# Patient Record
Sex: FEMALE | Race: WHITE | Employment: FULL TIME | URBAN - METROPOLITAN AREA
[De-identification: names, ages, dates, MRNs, and addresses within clinical notes are randomized per-mention and may not be internally consistent; named-entity substitution may affect disease eponyms.]

---

## 2017-11-06 ENCOUNTER — APPOINTMENT (OUTPATIENT)
Dept: GENERAL RADIOLOGY | Age: 57
End: 2017-11-06
Attending: PEDIATRICS
Payer: COMMERCIAL

## 2017-11-06 ENCOUNTER — HOSPITAL ENCOUNTER (OUTPATIENT)
Age: 57
Discharge: HOME | End: 2017-11-06
Attending: PEDIATRICS
Payer: COMMERCIAL

## 2017-11-06 VITALS
BODY MASS INDEX: 24 KG/M2 | SYSTOLIC BLOOD PRESSURE: 150 MMHG | OXYGEN SATURATION: 100 % | TEMPERATURE: 98 F | WEIGHT: 140 LBS | DIASTOLIC BLOOD PRESSURE: 72 MMHG | HEART RATE: 67 BPM | RESPIRATION RATE: 16 BRPM

## 2017-11-06 DIAGNOSIS — S50.11XA CONTUSION OF RIGHT FOREARM, INITIAL ENCOUNTER: ICD-10-CM

## 2017-11-06 DIAGNOSIS — S63.501A SPRAIN OF RIGHT WRIST, INITIAL ENCOUNTER: Primary | ICD-10-CM

## 2017-11-06 PROCEDURE — 73110 X-RAY EXAM OF WRIST: CPT | Performed by: PEDIATRICS

## 2017-11-06 PROCEDURE — 73130 X-RAY EXAM OF HAND: CPT | Performed by: PEDIATRICS

## 2017-11-06 PROCEDURE — 99203 OFFICE O/P NEW LOW 30 MIN: CPT

## 2017-11-06 PROCEDURE — 99213 OFFICE O/P EST LOW 20 MIN: CPT

## 2017-11-06 NOTE — ED INITIAL ASSESSMENT (HPI)
7 days ago, pt. Was hiking and slipped on wet rocks multiple times. Pt. Injured right wrist and hand. +swelling and ecchymosis  +tenderness to radial wrist and 1st/2nd metacarpals.    +pain radiates to forearm/elbow area

## 2017-11-06 NOTE — ED PROVIDER NOTES
Patient presents with:  Wrist Pain      HPI:     Edith Tavera is a 56year old female who presents today with a chief complaint of pain in the right hand, wrist, distal forearm area for a little over a week.   Patient states she was on a hike in [facility] with fracture    Labs performed this visit:  No results found for this or any previous visit (from the past 10 hour(s)). Diagnosis:    ICD-10-CM    1. Sprain of right wrist, initial encounter S63.501A    2.  Contusion of right forearm, initial encounter S50.1

## 2017-11-28 ENCOUNTER — OFFICE VISIT (OUTPATIENT)
Dept: INTERNAL MEDICINE CLINIC | Facility: CLINIC | Age: 57
End: 2017-11-28

## 2017-11-28 VITALS
DIASTOLIC BLOOD PRESSURE: 80 MMHG | HEART RATE: 63 BPM | WEIGHT: 152.81 LBS | HEIGHT: 63 IN | SYSTOLIC BLOOD PRESSURE: 148 MMHG | BODY MASS INDEX: 27.07 KG/M2 | OXYGEN SATURATION: 98 % | TEMPERATURE: 98 F

## 2017-11-28 DIAGNOSIS — Z12.39 SCREENING FOR BREAST CANCER: Primary | ICD-10-CM

## 2017-11-28 DIAGNOSIS — E03.9 HYPOTHYROIDISM, UNSPECIFIED TYPE: ICD-10-CM

## 2017-11-28 DIAGNOSIS — E78.00 HYPERCHOLESTEROLEMIA: ICD-10-CM

## 2017-11-28 DIAGNOSIS — R73.9 HYPERGLYCEMIA: ICD-10-CM

## 2017-11-28 DIAGNOSIS — Z00.00 ROUTINE HEALTH MAINTENANCE: ICD-10-CM

## 2017-11-28 DIAGNOSIS — I10 HYPERTENSION, BENIGN: ICD-10-CM

## 2017-11-28 DIAGNOSIS — E55.9 VITAMIN D DEFICIENCY: ICD-10-CM

## 2017-11-28 PROCEDURE — 90471 IMMUNIZATION ADMIN: CPT | Performed by: INTERNAL MEDICINE

## 2017-11-28 PROCEDURE — 99396 PREV VISIT EST AGE 40-64: CPT | Performed by: INTERNAL MEDICINE

## 2017-11-28 PROCEDURE — 90715 TDAP VACCINE 7 YRS/> IM: CPT | Performed by: INTERNAL MEDICINE

## 2017-11-28 RX ORDER — ATENOLOL 50 MG/1
50 TABLET ORAL
Qty: 90 TABLET | Refills: 3 | Status: SHIPPED | OUTPATIENT
Start: 2017-11-28 | End: 2017-11-30

## 2017-11-28 RX ORDER — LEVOTHYROXINE SODIUM 0.03 MG/1
TABLET ORAL
Qty: 90 TABLET | Refills: 3 | Status: SHIPPED | OUTPATIENT
Start: 2017-11-28 | End: 2018-11-25

## 2017-11-28 RX ORDER — ERGOCALCIFEROL 1.25 MG/1
50000 CAPSULE ORAL WEEKLY
Qty: 13 CAPSULE | Refills: 3 | Status: SHIPPED | OUTPATIENT
Start: 2017-11-28 | End: 2018-11-25

## 2017-11-28 NOTE — PROGRESS NOTES
Maegan Fry is a 56year old female. Patient presents with:  Physical: Presents for annual physical. Next PAP due . Last mammogram 2016. Needs refills for all medications. Needs new lab orders, they have .       HPI:   Maegan Fry is a 5 SYSTEMS:   GENERAL: feels well otherwise  SKIN: denies any unusual skin lesions  EYES:denies blurred vision or double vision  HEENT: denies nasal congestion, sinus pain or ST  LUNGS: denies shortness of breath with exertion or cough  CARDIOVASCULAR: denies conservatively, white noise as needed. Has actually been minimal in the past several months.      RTC 1 yr.       Lynda Baig MD  11/28/2017  5:54 PM

## 2017-11-29 ENCOUNTER — TELEPHONE (OUTPATIENT)
Dept: INTERNAL MEDICINE CLINIC | Facility: CLINIC | Age: 57
End: 2017-11-29

## 2017-11-29 NOTE — TELEPHONE ENCOUNTER
[facility] sent a fax requesting an alternative for Atenolol 50 mg. Atenolol is on backorder. Placed in purple folder. To Rx.

## 2017-11-30 RX ORDER — ATENOLOL 25 MG/1
50 TABLET ORAL DAILY
Qty: 180 TABLET | Refills: 0 | Status: SHIPPED | OUTPATIENT
Start: 2017-11-30 | End: 2019-01-17

## 2017-12-01 RX ORDER — LEVOTHYROXINE SODIUM 0.03 MG/1
TABLET ORAL
Qty: 90 TABLET | Refills: 3 | OUTPATIENT
Start: 2017-12-01

## 2017-12-01 RX ORDER — ATENOLOL 50 MG/1
50 TABLET ORAL
Qty: 90 TABLET | Refills: 3 | OUTPATIENT
Start: 2017-12-01

## 2017-12-01 RX ORDER — ERGOCALCIFEROL 1.25 MG/1
50000 CAPSULE ORAL WEEKLY
Qty: 13 CAPSULE | Refills: 3 | OUTPATIENT
Start: 2017-12-01

## 2017-12-01 NOTE — TELEPHONE ENCOUNTER
[facility] - [facility] not [facility];    Pharmacist confirmed they have 25 mg and he will communicate change to pt - new Rx sent in

## 2018-02-28 RX ORDER — ATENOLOL 50 MG/1
50 TABLET ORAL DAILY
Qty: 90 TABLET | Refills: 0 | Status: SHIPPED | OUTPATIENT
Start: 2018-02-28 | End: 2019-03-16

## 2018-02-28 RX ORDER — ATENOLOL 25 MG/1
50 TABLET ORAL DAILY
Qty: 180 TABLET | Refills: 0 | OUTPATIENT
Start: 2018-02-28

## 2018-02-28 NOTE — TELEPHONE ENCOUNTER
Refill done;   [facility],  We refilled your blood pressure medication today (for the 50 mg tablets so take one tablet daily)  At your last visit with doctor, she wanted you to send in your home monitoring blood pressure readings.   Have you been

## 2018-11-21 ENCOUNTER — APPOINTMENT (OUTPATIENT)
Dept: LAB | Facility: HOSPITAL | Age: 58
End: 2018-11-21
Attending: INTERNAL MEDICINE
Payer: COMMERCIAL

## 2018-11-21 DIAGNOSIS — R73.9 HYPERGLYCEMIA: ICD-10-CM

## 2018-11-21 DIAGNOSIS — Z00.00 ROUTINE HEALTH MAINTENANCE: ICD-10-CM

## 2018-11-21 DIAGNOSIS — I10 HYPERTENSION, BENIGN: ICD-10-CM

## 2018-11-21 DIAGNOSIS — E78.00 HYPERCHOLESTEROLEMIA: ICD-10-CM

## 2018-11-21 PROCEDURE — 84443 ASSAY THYROID STIM HORMONE: CPT

## 2018-11-21 PROCEDURE — 36415 COLL VENOUS BLD VENIPUNCTURE: CPT

## 2018-11-21 PROCEDURE — 80061 LIPID PANEL: CPT

## 2018-11-21 PROCEDURE — 82306 VITAMIN D 25 HYDROXY: CPT

## 2018-11-21 PROCEDURE — 80053 COMPREHEN METABOLIC PANEL: CPT

## 2018-11-21 PROCEDURE — 83036 HEMOGLOBIN GLYCOSYLATED A1C: CPT

## 2018-11-25 RX ORDER — LEVOTHYROXINE SODIUM 0.03 MG/1
TABLET ORAL
Qty: 90 TABLET | Refills: 3 | Status: SHIPPED | OUTPATIENT
Start: 2018-11-25 | End: 2019-12-05

## 2018-11-25 RX ORDER — ERGOCALCIFEROL 1.25 MG/1
50000 CAPSULE ORAL WEEKLY
Qty: 13 CAPSULE | Refills: 3 | Status: SHIPPED | OUTPATIENT
Start: 2018-11-25 | End: 2020-02-12

## 2018-12-18 PROBLEM — R73.03 PREDIABETES: Status: ACTIVE | Noted: 2018-12-18

## 2019-01-17 ENCOUNTER — OFFICE VISIT (OUTPATIENT)
Dept: INTERNAL MEDICINE CLINIC | Facility: CLINIC | Age: 59
End: 2019-01-17
Payer: COMMERCIAL

## 2019-01-17 VITALS
OXYGEN SATURATION: 98 % | SYSTOLIC BLOOD PRESSURE: 138 MMHG | BODY MASS INDEX: 26 KG/M2 | DIASTOLIC BLOOD PRESSURE: 88 MMHG | HEART RATE: 68 BPM | TEMPERATURE: 98 F | WEIGHT: 148 LBS

## 2019-01-17 DIAGNOSIS — Z11.51 SCREENING FOR HPV (HUMAN PAPILLOMAVIRUS): ICD-10-CM

## 2019-01-17 DIAGNOSIS — I10 HYPERTENSION, BENIGN: ICD-10-CM

## 2019-01-17 DIAGNOSIS — Z12.31 ENCOUNTER FOR SCREENING MAMMOGRAM FOR BREAST CANCER: ICD-10-CM

## 2019-01-17 DIAGNOSIS — Z11.59 NEED FOR HEPATITIS C SCREENING TEST: ICD-10-CM

## 2019-01-17 DIAGNOSIS — E03.9 HYPOTHYROIDISM, UNSPECIFIED TYPE: ICD-10-CM

## 2019-01-17 DIAGNOSIS — R73.03 PREDIABETES: Primary | ICD-10-CM

## 2019-01-17 DIAGNOSIS — E78.00 HYPERCHOLESTEROLEMIA: ICD-10-CM

## 2019-01-17 DIAGNOSIS — Z00.00 ROUTINE HEALTH MAINTENANCE: ICD-10-CM

## 2019-01-17 PROCEDURE — 99396 PREV VISIT EST AGE 40-64: CPT | Performed by: INTERNAL MEDICINE

## 2019-01-17 NOTE — PROGRESS NOTES
Nilesh Ramachandran is a 66year old female. Patient presents with:  Physical  Gyn Exam      HPI:   Nilesh Ramachandran is a 66year old female who presents for a complete physical exam.     Feels well.       Wt Readings from Last 6 Encounters:  01/17/19 : 148 lb (67.1 kg or dizziness    EXAM:   /88 (BP Location: Right arm, Patient Position: Sitting, Cuff Size: adult)   Pulse 68   Temp 98.2 °F (36.8 °C) (Oral)   Wt 148 lb (67.1 kg)   SpO2 98%   BMI 26.22 kg/m²     GENERAL: well developed, well nourished, in no apparen

## 2019-01-22 LAB — HPV I/H RISK 1 DNA SPEC QL NAA+PROBE: NEGATIVE

## 2019-02-04 ENCOUNTER — APPOINTMENT (OUTPATIENT)
Dept: CT IMAGING | Facility: HOSPITAL | Age: 59
End: 2019-02-04
Attending: EMERGENCY MEDICINE
Payer: COMMERCIAL

## 2019-02-04 ENCOUNTER — TELEPHONE (OUTPATIENT)
Dept: INTERNAL MEDICINE CLINIC | Facility: CLINIC | Age: 59
End: 2019-02-04

## 2019-02-04 ENCOUNTER — HOSPITAL ENCOUNTER (EMERGENCY)
Facility: HOSPITAL | Age: 59
Discharge: HOME | End: 2019-02-04
Attending: EMERGENCY MEDICINE
Payer: COMMERCIAL

## 2019-02-04 VITALS
HEART RATE: 83 BPM | OXYGEN SATURATION: 97 % | WEIGHT: 140 LBS | BODY MASS INDEX: 24.5 KG/M2 | DIASTOLIC BLOOD PRESSURE: 75 MMHG | SYSTOLIC BLOOD PRESSURE: 134 MMHG | TEMPERATURE: 98 F | HEIGHT: 63.5 IN | RESPIRATION RATE: 15 BRPM

## 2019-02-04 DIAGNOSIS — K52.9 ACUTE COLITIS: Primary | ICD-10-CM

## 2019-02-04 LAB
ANION GAP SERPL CALC-SCNC: 12 MMOL/L (ref 0–18)
BASOPHILS # BLD AUTO: 0.01 X10(3) UL (ref 0–0.2)
BASOPHILS NFR BLD AUTO: 0.1 %
BILIRUB UR QL: NEGATIVE
BUN SERPL-MCNC: 10 MG/DL (ref 8–20)
BUN/CREAT SERPL: 13.2 (ref 10–20)
CALCIUM SERPL-MCNC: 9.7 MG/DL (ref 8.5–10.5)
CHLORIDE SERPL-SCNC: 102 MMOL/L (ref 95–110)
CO2 SERPL-SCNC: 25 MMOL/L (ref 22–32)
COLOR UR: YELLOW
CREAT SERPL-MCNC: 0.76 MG/DL (ref 0.5–1.5)
DEPRECATED RDW RBC AUTO: 43.9 FL (ref 35.1–46.3)
EOSINOPHIL # BLD AUTO: 0.07 X10(3) UL (ref 0–0.7)
EOSINOPHIL NFR BLD AUTO: 0.6 %
ERYTHROCYTE [DISTWIDTH] IN BLOOD BY AUTOMATED COUNT: 13.2 % (ref 11–15)
GLUCOSE SERPL-MCNC: 97 MG/DL (ref 70–99)
GLUCOSE UR-MCNC: NEGATIVE MG/DL
HCT VFR BLD AUTO: 41 % (ref 35–48)
HGB BLD-MCNC: 13.4 G/DL (ref 12–16)
IMM GRANULOCYTES # BLD AUTO: 0.03 X10(3) UL (ref 0–1)
IMM GRANULOCYTES NFR BLD: 0.3 %
KETONES UR-MCNC: NEGATIVE MG/DL
LYMPHOCYTES # BLD AUTO: 2.41 X10(3) UL (ref 1–4)
LYMPHOCYTES NFR BLD AUTO: 21.6 %
MCH RBC QN AUTO: 29.3 PG (ref 26–34)
MCHC RBC AUTO-ENTMCNC: 32.7 G/DL (ref 31–37)
MCV RBC AUTO: 89.7 FL (ref 80–100)
MONOCYTES # BLD AUTO: 0.53 X10(3) UL (ref 0.1–1)
MONOCYTES NFR BLD AUTO: 4.8 %
NEUTROPHILS # BLD AUTO: 8.09 X10 (3) UL (ref 1.5–7.7)
NEUTROPHILS # BLD AUTO: 8.09 X10(3) UL (ref 1.5–7.7)
NEUTROPHILS NFR BLD AUTO: 72.6 %
NITRITE UR QL STRIP.AUTO: NEGATIVE
OSMOLALITY UR CALC.SUM OF ELEC: 287 MOSM/KG (ref 275–295)
PH UR: 5 [PH] (ref 5–8)
PLATELET # BLD AUTO: 312 10(3)UL (ref 150–450)
POTASSIUM SERPL-SCNC: 3.7 MMOL/L (ref 3.3–5.1)
PROT UR-MCNC: NEGATIVE MG/DL
RBC # BLD AUTO: 4.57 X10(6)UL (ref 3.8–5.3)
RBC #/AREA URNS AUTO: 4 /HPF
SODIUM SERPL-SCNC: 139 MMOL/L (ref 136–144)
SP GR UR STRIP: 1.01 (ref 1–1.03)
UROBILINOGEN UR STRIP-ACNC: <2
VIT C UR-MCNC: NEGATIVE MG/DL
WBC # BLD AUTO: 11.1 X10(3) UL (ref 4–11)
WBC #/AREA URNS AUTO: 5 /HPF

## 2019-02-04 PROCEDURE — 80048 BASIC METABOLIC PNL TOTAL CA: CPT | Performed by: EMERGENCY MEDICINE

## 2019-02-04 PROCEDURE — 82272 OCCULT BLD FECES 1-3 TESTS: CPT

## 2019-02-04 PROCEDURE — 74177 CT ABD & PELVIS W/CONTRAST: CPT | Performed by: EMERGENCY MEDICINE

## 2019-02-04 PROCEDURE — 81001 URINALYSIS AUTO W/SCOPE: CPT | Performed by: EMERGENCY MEDICINE

## 2019-02-04 PROCEDURE — 99285 EMERGENCY DEPT VISIT HI MDM: CPT

## 2019-02-04 PROCEDURE — 96360 HYDRATION IV INFUSION INIT: CPT

## 2019-02-04 PROCEDURE — 85025 COMPLETE CBC W/AUTO DIFF WBC: CPT | Performed by: EMERGENCY MEDICINE

## 2019-02-04 NOTE — TELEPHONE ENCOUNTER
Spoke to patient to gain information regarding MyChart message below. Patient reports that she has been having abdominal pain since the weekend.  She described it as an intermittent pain that has been occurring about every hour, feels like a \"tight crampin I think it could be an ulcer, yesterday I took 2 Aleve for ankle pain I have.  I have not taken Aleve for a good month but have taken a lot over the years.       Looking to see what I should do next, for me to seek medical advice is a big deal, as I usuall

## 2019-02-05 NOTE — ED INITIAL ASSESSMENT (HPI)
Pt to ER with c/o diffuse abdominal pain 10/10 and 3 episode of black stools. Pt denies chest pain or sob. Pt denies dizziness. No respiratory distress noted. 98% on room air. Pt is alert and oriented x4. No thinners or asa.

## 2019-02-05 NOTE — ED PROVIDER NOTES
Patient Seen in: [facility] Emergency Department    History   Patient presents with:  Abdomen/Flank Pain (GI/)  GI Bleeding (gastrointestinal)    Stated Complaint: black stool and bright red blood     HPI    The patient is a 62-year-old female w round, and reactive to light. Neck: Normal range of motion. Neck supple. No JVD present. Cardiovascular: Normal rate, regular rhythm, normal heart sounds and intact distal pulses. No murmur heard.   Pulmonary/Chest: Effort normal and breath sounds nor view results for these tests on the individual orders.    RAINBOW DRAW BLUE   RAINBOW DRAW LAVENDER   RAINBOW DRAW DARK GREEN   RAINBOW DRAW LIGHT GREEN   RAINBOW DRAW GOLD   RAINBOW DRAW LAVENDER TALL (BNP)   GI STOOL PANEL BY PCR          Case discussed w

## 2019-03-17 RX ORDER — ATENOLOL 50 MG/1
50 TABLET ORAL DAILY
Qty: 90 TABLET | Refills: 0 | Status: SHIPPED | OUTPATIENT
Start: 2019-03-17 | End: 2019-06-11

## 2019-03-21 RX ORDER — ATENOLOL 50 MG/1
50 TABLET ORAL DAILY
Qty: 90 TABLET | Refills: 0 | OUTPATIENT
Start: 2019-03-21

## 2019-03-21 NOTE — TELEPHONE ENCOUNTER
Current refill request refused due to refill is either a duplicate request or has active refills at the pharmacy. Check previous templates.     Requested Prescriptions     Refused Prescriptions Disp Refills   • ATENOLOL 50 MG Oral Tab [Pharmacy Med Name: A

## 2019-06-11 RX ORDER — ATENOLOL 50 MG/1
50 TABLET ORAL DAILY
Qty: 90 TABLET | Refills: 0 | Status: SHIPPED | OUTPATIENT
Start: 2019-06-11 | End: 2019-09-06

## 2019-06-11 NOTE — TELEPHONE ENCOUNTER
Pt has 6 month f/u schedule 7/19. Refill request is for a maintenance medication and has met the criteria specified in the Ambulatory Medication Refill Standing Order for eligibility, visits, laboratory, alerts and was sent to the requested pharmacy.

## 2019-09-06 RX ORDER — ATENOLOL 50 MG/1
50 TABLET ORAL DAILY
Qty: 90 TABLET | Refills: 0 | Status: SHIPPED | OUTPATIENT
Start: 2019-09-06 | End: 2020-01-02

## 2019-12-05 ENCOUNTER — TELEPHONE (OUTPATIENT)
Dept: INTERNAL MEDICINE CLINIC | Facility: CLINIC | Age: 59
End: 2019-12-05

## 2019-12-05 DIAGNOSIS — E55.9 VITAMIN D DEFICIENCY: ICD-10-CM

## 2019-12-05 DIAGNOSIS — E78.00 HYPERCHOLESTEROLEMIA: ICD-10-CM

## 2019-12-05 DIAGNOSIS — I10 HYPERTENSION, BENIGN: ICD-10-CM

## 2019-12-05 DIAGNOSIS — E03.9 HYPOTHYROIDISM, UNSPECIFIED TYPE: ICD-10-CM

## 2019-12-05 DIAGNOSIS — Z00.00 ANNUAL PHYSICAL EXAM: Primary | ICD-10-CM

## 2019-12-05 DIAGNOSIS — R73.03 PREDIABETES: ICD-10-CM

## 2019-12-05 RX ORDER — LEVOTHYROXINE SODIUM 0.03 MG/1
TABLET ORAL
Qty: 90 TABLET | Refills: 0 | Status: SHIPPED | OUTPATIENT
Start: 2019-12-05 | End: 2020-02-12

## 2019-12-05 NOTE — TELEPHONE ENCOUNTER
Patient is overdue for labs. Due for physical in January, this is already scheduled. Three month supply of levothyroxine sent to her pharmacy. Labs pended to Dr. Baldev Cleaning.  Some labs were ordered back in January and I was not sure if you wanted to reorder t

## 2019-12-06 NOTE — TELEPHONE ENCOUNTER
Pt called back and was informed that her labs were ordered and that she has to fast for 12 hours beforehand.

## 2019-12-13 ENCOUNTER — LAB ENCOUNTER (OUTPATIENT)
Dept: LAB | Facility: HOSPITAL | Age: 59
End: 2019-12-13
Attending: INTERNAL MEDICINE
Payer: COMMERCIAL

## 2019-12-13 DIAGNOSIS — Z00.00 ANNUAL PHYSICAL EXAM: ICD-10-CM

## 2019-12-13 DIAGNOSIS — E78.00 HYPERCHOLESTEROLEMIA: ICD-10-CM

## 2019-12-13 DIAGNOSIS — E03.9 HYPOTHYROIDISM, UNSPECIFIED TYPE: ICD-10-CM

## 2019-12-13 DIAGNOSIS — E55.9 VITAMIN D DEFICIENCY: ICD-10-CM

## 2019-12-13 DIAGNOSIS — I10 HYPERTENSION, BENIGN: ICD-10-CM

## 2019-12-13 DIAGNOSIS — R73.03 PREDIABETES: ICD-10-CM

## 2019-12-13 PROCEDURE — 82570 ASSAY OF URINE CREATININE: CPT

## 2019-12-13 PROCEDURE — 80053 COMPREHEN METABOLIC PANEL: CPT

## 2019-12-13 PROCEDURE — 82306 VITAMIN D 25 HYDROXY: CPT

## 2019-12-13 PROCEDURE — 83036 HEMOGLOBIN GLYCOSYLATED A1C: CPT

## 2019-12-13 PROCEDURE — 82043 UR ALBUMIN QUANTITATIVE: CPT

## 2019-12-13 PROCEDURE — 84443 ASSAY THYROID STIM HORMONE: CPT

## 2019-12-13 PROCEDURE — 80061 LIPID PANEL: CPT

## 2019-12-13 PROCEDURE — 36415 COLL VENOUS BLD VENIPUNCTURE: CPT

## 2019-12-13 PROCEDURE — 85025 COMPLETE CBC W/AUTO DIFF WBC: CPT

## 2020-01-02 RX ORDER — ATENOLOL 50 MG/1
50 TABLET ORAL DAILY
Qty: 90 TABLET | Refills: 3 | Status: SHIPPED | OUTPATIENT
Start: 2020-01-02 | End: 2020-12-14

## 2020-01-19 ENCOUNTER — HOSPITAL ENCOUNTER (OUTPATIENT)
Age: 60
Discharge: HOME | End: 2020-01-19
Attending: FAMILY MEDICINE
Payer: COMMERCIAL

## 2020-01-19 VITALS
SYSTOLIC BLOOD PRESSURE: 138 MMHG | TEMPERATURE: 98 F | OXYGEN SATURATION: 98 % | HEART RATE: 77 BPM | DIASTOLIC BLOOD PRESSURE: 70 MMHG | RESPIRATION RATE: 14 BRPM

## 2020-01-19 DIAGNOSIS — J02.0 STREP THROAT: Primary | ICD-10-CM

## 2020-01-19 LAB — S PYO AG THROAT QL: POSITIVE

## 2020-01-19 PROCEDURE — 99214 OFFICE O/P EST MOD 30 MIN: CPT

## 2020-01-19 PROCEDURE — 87430 STREP A AG IA: CPT

## 2020-01-19 PROCEDURE — 99213 OFFICE O/P EST LOW 20 MIN: CPT

## 2020-01-19 RX ORDER — AZITHROMYCIN 500 MG/1
500 TABLET, FILM COATED ORAL DAILY
Qty: 5 TABLET | Refills: 0 | Status: SHIPPED | OUTPATIENT
Start: 2020-01-19 | End: 2020-01-24

## 2020-01-19 NOTE — ED PROVIDER NOTES
Patient presents with:  Sore Throat      HPI:     Marcus Cha is a 64year old female who presents with for chief complaint of nasal congestion, sore throat, fatigue. X 3 days.     The patient denies complaints of  neck pain, ear pain, difficulty breathin regular rhythm and intact distal pulses. Exam reveals no gallop and no friction rub. No murmur heard. 4.RESPIRATORY/Pulmonary/Chest: Effort normal and breath sounds normal. No stridor. No respiratory distress. no wheezes. no rales.     5. GI: Abdominal

## 2020-02-05 ENCOUNTER — TELEPHONE (OUTPATIENT)
Dept: INTERNAL MEDICINE CLINIC | Facility: CLINIC | Age: 60
End: 2020-02-05

## 2020-02-05 DIAGNOSIS — Z12.39 SCREENING FOR BREAST CANCER: Primary | ICD-10-CM

## 2020-02-05 NOTE — TELEPHONE ENCOUNTER
Mammogram order has   Requests new order so she can have mammo done prior to her appt with Dr Sung Cordero on      Please call to confirm [phone]

## 2020-02-05 NOTE — TELEPHONE ENCOUNTER
New order for mammogram entered per protocol and called patient to inform - verbalized understanding

## 2020-02-12 ENCOUNTER — OFFICE VISIT (OUTPATIENT)
Dept: INTERNAL MEDICINE CLINIC | Facility: CLINIC | Age: 60
End: 2020-02-12
Payer: COMMERCIAL

## 2020-02-12 VITALS
TEMPERATURE: 98 F | WEIGHT: 150 LBS | SYSTOLIC BLOOD PRESSURE: 112 MMHG | DIASTOLIC BLOOD PRESSURE: 70 MMHG | BODY MASS INDEX: 26.25 KG/M2 | HEIGHT: 63.5 IN | HEART RATE: 64 BPM

## 2020-02-12 DIAGNOSIS — E78.00 HYPERCHOLESTEROLEMIA: ICD-10-CM

## 2020-02-12 DIAGNOSIS — E03.9 HYPOTHYROIDISM, UNSPECIFIED TYPE: ICD-10-CM

## 2020-02-12 DIAGNOSIS — R73.03 PREDIABETES: ICD-10-CM

## 2020-02-12 DIAGNOSIS — E55.9 VITAMIN D DEFICIENCY: ICD-10-CM

## 2020-02-12 DIAGNOSIS — Z78.0 POSTMENOPAUSE: Primary | ICD-10-CM

## 2020-02-12 DIAGNOSIS — Z00.00 ROUTINE HEALTH MAINTENANCE: ICD-10-CM

## 2020-02-12 DIAGNOSIS — I10 HYPERTENSION, BENIGN: ICD-10-CM

## 2020-02-12 PROCEDURE — 99396 PREV VISIT EST AGE 40-64: CPT | Performed by: INTERNAL MEDICINE

## 2020-02-12 RX ORDER — LEVOTHYROXINE SODIUM 0.03 MG/1
25 TABLET ORAL
Qty: 90 TABLET | Refills: 3 | Status: SHIPPED | OUTPATIENT
Start: 2020-02-12 | End: 2021-03-11

## 2020-02-12 NOTE — PROGRESS NOTES
Kartik Yen is a 64year old female. Patient presents with:  Physical: Patient is here today for a PE. Last pap exam negative in 1/2019. Patient states that she has been feeling good lately. No issues at this time.        HPI:   Kartik Yen is a 64 year ol Used    Alcohol use: Yes      Comment: Occasionally    Drug use: No         REVIEW OF SYSTEMS:   GENERAL: feels well otherwise  SKIN: denies any unusual skin lesions  EYES:denies blurred vision or double vision  HEENT: denies nasal congestion, sinus pain o

## 2020-02-18 ENCOUNTER — HOSPITAL ENCOUNTER (OUTPATIENT)
Dept: MAMMOGRAPHY | Age: 60
Discharge: HOME | End: 2020-02-18
Attending: INTERNAL MEDICINE
Payer: COMMERCIAL

## 2020-02-18 ENCOUNTER — HOSPITAL ENCOUNTER (OUTPATIENT)
Dept: BONE DENSITY | Age: 60
Discharge: HOME | End: 2020-02-18
Attending: INTERNAL MEDICINE
Payer: COMMERCIAL

## 2020-02-18 DIAGNOSIS — Z78.0 POSTMENOPAUSE: ICD-10-CM

## 2020-02-18 DIAGNOSIS — Z12.39 SCREENING FOR BREAST CANCER: ICD-10-CM

## 2020-02-18 PROCEDURE — 77063 BREAST TOMOSYNTHESIS BI: CPT | Performed by: INTERNAL MEDICINE

## 2020-02-18 PROCEDURE — 77067 SCR MAMMO BI INCL CAD: CPT | Performed by: INTERNAL MEDICINE

## 2020-02-18 PROCEDURE — 77080 DXA BONE DENSITY AXIAL: CPT | Performed by: INTERNAL MEDICINE

## 2020-12-14 RX ORDER — ATENOLOL 50 MG/1
TABLET ORAL
Qty: 90 TABLET | Refills: 3 | Status: SHIPPED | OUTPATIENT
Start: 2020-12-14 | End: 2021-04-01

## 2021-03-11 RX ORDER — LEVOTHYROXINE SODIUM 0.03 MG/1
TABLET ORAL
Qty: 90 TABLET | Refills: 0 | Status: SHIPPED | OUTPATIENT
Start: 2021-03-11 | End: 2021-04-01

## 2021-03-11 NOTE — TELEPHONE ENCOUNTER
Pt due for physical. Has appt scheduled 4/1/21. Short fill given.       Refill request is for a maintenance medication and has met the criteria specified in the Ambulatory Medication Refill Standing Order for eligibility, visits, laboratory, alerts and was

## 2021-04-01 ENCOUNTER — OFFICE VISIT (OUTPATIENT)
Dept: INTERNAL MEDICINE CLINIC | Facility: CLINIC | Age: 61
End: 2021-04-01
Payer: COMMERCIAL

## 2021-04-01 VITALS
BODY MASS INDEX: 26.71 KG/M2 | HEIGHT: 63.5 IN | SYSTOLIC BLOOD PRESSURE: 132 MMHG | HEART RATE: 66 BPM | OXYGEN SATURATION: 99 % | WEIGHT: 152.63 LBS | TEMPERATURE: 98 F | DIASTOLIC BLOOD PRESSURE: 92 MMHG

## 2021-04-01 DIAGNOSIS — I10 HYPERTENSION, BENIGN: ICD-10-CM

## 2021-04-01 DIAGNOSIS — R73.03 PREDIABETES: ICD-10-CM

## 2021-04-01 DIAGNOSIS — E03.9 HYPOTHYROIDISM, UNSPECIFIED TYPE: ICD-10-CM

## 2021-04-01 DIAGNOSIS — E55.9 VITAMIN D DEFICIENCY: ICD-10-CM

## 2021-04-01 DIAGNOSIS — Z00.00 ROUTINE HEALTH MAINTENANCE: ICD-10-CM

## 2021-04-01 DIAGNOSIS — E78.00 HYPERCHOLESTEROLEMIA: Primary | ICD-10-CM

## 2021-04-01 DIAGNOSIS — Z83.79 FAMILY HISTORY OF CELIAC DISEASE: ICD-10-CM

## 2021-04-01 DIAGNOSIS — Z12.31 ENCOUNTER FOR SCREENING MAMMOGRAM FOR MALIGNANT NEOPLASM OF BREAST: ICD-10-CM

## 2021-04-01 PROCEDURE — 3080F DIAST BP >= 90 MM HG: CPT | Performed by: INTERNAL MEDICINE

## 2021-04-01 PROCEDURE — 99396 PREV VISIT EST AGE 40-64: CPT | Performed by: INTERNAL MEDICINE

## 2021-04-01 PROCEDURE — 3008F BODY MASS INDEX DOCD: CPT | Performed by: INTERNAL MEDICINE

## 2021-04-01 PROCEDURE — 3075F SYST BP GE 130 - 139MM HG: CPT | Performed by: INTERNAL MEDICINE

## 2021-04-01 RX ORDER — ATENOLOL 50 MG/1
50 TABLET ORAL DAILY
Qty: 90 TABLET | Refills: 3 | Status: SHIPPED | OUTPATIENT
Start: 2021-04-01

## 2021-04-01 RX ORDER — LEVOTHYROXINE SODIUM 0.03 MG/1
25 TABLET ORAL
Qty: 90 TABLET | Refills: 3 | Status: SHIPPED | OUTPATIENT
Start: 2021-04-01

## 2021-04-01 NOTE — PROGRESS NOTES
Nikita Loza is a 64year old female. Patient presents with:  Physical: Last pap done January 2019. Feels well. HPI:   Nikita Loza is a 64year old female who presents for a complete physical exam.     Feels well.   Has been exercising at home with hu otherwise  SKIN: denies any unusual skin lesions  EYES:denies blurred vision or double vision  HEENT: denies nasal congestion, sinus pain or ST  LUNGS: denies shortness of breath with exertion or cough  CARDIOVASCULAR: denies chest pain, pressure, or palpi

## 2021-05-19 ENCOUNTER — IMMUNIZATION (OUTPATIENT)
Dept: LAB | Facility: HOSPITAL | Age: 61
End: 2021-05-19
Attending: EMERGENCY MEDICINE
Payer: COMMERCIAL

## 2021-05-19 DIAGNOSIS — Z23 NEED FOR VACCINATION: Primary | ICD-10-CM

## 2021-05-19 PROCEDURE — 0001A SARSCOV2 VAC 30MCG/0.3ML IM: CPT

## 2021-06-09 ENCOUNTER — IMMUNIZATION (OUTPATIENT)
Dept: LAB | Facility: HOSPITAL | Age: 61
End: 2021-06-09
Attending: EMERGENCY MEDICINE
Payer: COMMERCIAL

## 2021-06-09 DIAGNOSIS — Z23 NEED FOR VACCINATION: Primary | ICD-10-CM

## 2021-06-09 PROCEDURE — 0002A SARSCOV2 VAC 30MCG/0.3ML IM: CPT

## 2021-07-23 ENCOUNTER — PATIENT MESSAGE (OUTPATIENT)
Dept: INTERNAL MEDICINE CLINIC | Facility: CLINIC | Age: 61
End: 2021-07-23

## 2021-07-23 ENCOUNTER — TELEPHONE (OUTPATIENT)
Dept: INTERNAL MEDICINE CLINIC | Facility: CLINIC | Age: 61
End: 2021-07-23

## 2021-07-23 NOTE — TELEPHONE ENCOUNTER
----- Message from BufordNiurka Hicks sent at 7/23/2021  8:32 AM CDT -----  Regarding: Non-Urgent Medical Question  Contact: [phone]  Hi Dr. Laurance Goodpasture, wanted to ask you if it’s possible to get a torn muscle from a needle.   I have had pain in the upper arm

## 2021-07-23 NOTE — TELEPHONE ENCOUNTER
Spoke with patient who reports she received her COVID vaccine 6/9/21 and her arm \"instantly hurt\", the needle felt like a \"jackhammer\". Arm doesn't hurt at rest or when there is no pressure on the area.  She states pain occurs with pressure, when laying

## 2021-07-27 ENCOUNTER — OFFICE VISIT (OUTPATIENT)
Dept: INTERNAL MEDICINE CLINIC | Facility: CLINIC | Age: 61
End: 2021-07-27
Payer: COMMERCIAL

## 2021-07-27 VITALS
DIASTOLIC BLOOD PRESSURE: 86 MMHG | TEMPERATURE: 98 F | HEART RATE: 58 BPM | WEIGHT: 153 LBS | OXYGEN SATURATION: 99 % | BODY MASS INDEX: 26.77 KG/M2 | HEIGHT: 63.5 IN | SYSTOLIC BLOOD PRESSURE: 136 MMHG

## 2021-07-27 DIAGNOSIS — M79.602 ARM PAIN, CENTRAL, LEFT: Primary | ICD-10-CM

## 2021-07-27 PROCEDURE — 99213 OFFICE O/P EST LOW 20 MIN: CPT | Performed by: INTERNAL MEDICINE

## 2021-07-27 PROCEDURE — 3079F DIAST BP 80-89 MM HG: CPT | Performed by: INTERNAL MEDICINE

## 2021-07-27 PROCEDURE — 3008F BODY MASS INDEX DOCD: CPT | Performed by: INTERNAL MEDICINE

## 2021-07-27 PROCEDURE — 3075F SYST BP GE 130 - 139MM HG: CPT | Performed by: INTERNAL MEDICINE

## 2021-07-27 RX ORDER — MELOXICAM 7.5 MG/1
7.5 TABLET ORAL DAILY
Qty: 30 TABLET | Refills: 0 | Status: SHIPPED | OUTPATIENT
Start: 2021-07-27

## 2021-07-27 NOTE — PROGRESS NOTES
Jessica Callaway is a 63year old female. Patient presents with:  Checkup: Patient reports arm pain since COVID vaccine 6/9/21.  She states pain occurs with pressure, when laying on her side, if she has her elbow on the table she will feel the pain shoot up to t Encounters:  07/27/21 : 153 lb (69.4 kg)  04/01/21 : 152 lb 9.6 oz (69.2 kg)  02/12/20 : 150 lb (68 kg)  02/04/19 : 140 lb (63.5 kg)  01/17/19 : 148 lb (67.1 kg)    Body mass index is 26.68 kg/m².       EXAM:   /86   Pulse 58   Temp 98.1 °F (36.7 °C)

## 2021-08-03 ENCOUNTER — HOSPITAL ENCOUNTER (OUTPATIENT)
Dept: MAMMOGRAPHY | Age: 61
Discharge: HOME | End: 2021-08-03
Attending: INTERNAL MEDICINE
Payer: COMMERCIAL

## 2021-08-03 DIAGNOSIS — Z12.31 ENCOUNTER FOR SCREENING MAMMOGRAM FOR MALIGNANT NEOPLASM OF BREAST: ICD-10-CM

## 2021-08-03 PROCEDURE — 77063 BREAST TOMOSYNTHESIS BI: CPT | Performed by: INTERNAL MEDICINE

## 2021-08-03 PROCEDURE — 77067 SCR MAMMO BI INCL CAD: CPT | Performed by: INTERNAL MEDICINE

## 2021-08-20 RX ORDER — MELOXICAM 7.5 MG/1
TABLET ORAL
Qty: 30 TABLET | Refills: 0 | OUTPATIENT
Start: 2021-08-20

## 2021-12-02 ENCOUNTER — LAB ENCOUNTER (OUTPATIENT)
Dept: LAB | Facility: HOSPITAL | Age: 61
End: 2021-12-02
Attending: INTERNAL MEDICINE
Payer: COMMERCIAL

## 2021-12-02 DIAGNOSIS — E55.9 VITAMIN D DEFICIENCY: ICD-10-CM

## 2021-12-02 DIAGNOSIS — R73.03 PREDIABETES: ICD-10-CM

## 2021-12-02 DIAGNOSIS — I10 HYPERTENSION, BENIGN: ICD-10-CM

## 2021-12-02 DIAGNOSIS — Z83.79 FAMILY HISTORY OF CELIAC DISEASE: ICD-10-CM

## 2021-12-02 DIAGNOSIS — E78.00 HYPERCHOLESTEROLEMIA: ICD-10-CM

## 2021-12-02 PROCEDURE — 84443 ASSAY THYROID STIM HORMONE: CPT | Performed by: INTERNAL MEDICINE

## 2021-12-02 PROCEDURE — 85025 COMPLETE CBC W/AUTO DIFF WBC: CPT

## 2021-12-02 PROCEDURE — 82043 UR ALBUMIN QUANTITATIVE: CPT

## 2021-12-02 PROCEDURE — 36415 COLL VENOUS BLD VENIPUNCTURE: CPT | Performed by: INTERNAL MEDICINE

## 2021-12-02 PROCEDURE — 83036 HEMOGLOBIN GLYCOSYLATED A1C: CPT | Performed by: INTERNAL MEDICINE

## 2021-12-02 PROCEDURE — 80053 COMPREHEN METABOLIC PANEL: CPT

## 2021-12-02 PROCEDURE — 80061 LIPID PANEL: CPT

## 2021-12-02 PROCEDURE — 82570 ASSAY OF URINE CREATININE: CPT

## 2021-12-02 PROCEDURE — 82784 ASSAY IGA/IGD/IGG/IGM EACH: CPT

## 2021-12-02 PROCEDURE — 82306 VITAMIN D 25 HYDROXY: CPT

## 2021-12-02 PROCEDURE — 83516 IMMUNOASSAY NONANTIBODY: CPT

## 2021-12-15 DIAGNOSIS — R73.03 PREDIABETES: ICD-10-CM

## 2021-12-15 DIAGNOSIS — E78.00 HYPERCHOLESTEROLEMIA: ICD-10-CM

## 2021-12-15 DIAGNOSIS — E55.9 VITAMIN D DEFICIENCY: Primary | ICD-10-CM

## 2021-12-15 RX ORDER — CHOLECALCIFEROL (VITAMIN D3) 50 MCG
4000 TABLET ORAL DAILY
Qty: 1 TABLET | Refills: 0 | COMMUNITY
Start: 2021-12-15

## 2022-06-29 RX ORDER — LEVOTHYROXINE SODIUM 0.03 MG/1
25 TABLET ORAL
Qty: 90 TABLET | Refills: 0 | Status: SHIPPED | OUTPATIENT
Start: 2022-06-29

## 2022-06-30 RX ORDER — ATENOLOL 50 MG/1
50 TABLET ORAL DAILY
Qty: 30 TABLET | Refills: 0 | Status: SHIPPED | OUTPATIENT
Start: 2022-06-30

## 2022-07-08 ENCOUNTER — LAB ENCOUNTER (OUTPATIENT)
Dept: LAB | Facility: HOSPITAL | Age: 62
End: 2022-07-08
Attending: INTERNAL MEDICINE
Payer: COMMERCIAL

## 2022-07-08 DIAGNOSIS — E55.9 VITAMIN D DEFICIENCY: ICD-10-CM

## 2022-07-08 DIAGNOSIS — R73.03 PREDIABETES: ICD-10-CM

## 2022-07-08 DIAGNOSIS — E78.00 HYPERCHOLESTEROLEMIA: ICD-10-CM

## 2022-07-08 LAB
ALBUMIN SERPL-MCNC: 4.1 G/DL (ref 3.4–5)
ALBUMIN/GLOB SERPL: 1.1 {RATIO} (ref 1–2)
ALP LIVER SERPL-CCNC: 89 U/L
ALT SERPL-CCNC: 36 U/L
ANION GAP SERPL CALC-SCNC: 6 MMOL/L (ref 0–18)
AST SERPL-CCNC: 19 U/L (ref 15–37)
BILIRUB SERPL-MCNC: 0.7 MG/DL (ref 0.1–2)
BUN BLD-MCNC: 16 MG/DL (ref 7–18)
BUN/CREAT SERPL: 17.6 (ref 10–20)
CALCIUM BLD-MCNC: 9.8 MG/DL (ref 8.5–10.1)
CHLORIDE SERPL-SCNC: 105 MMOL/L (ref 98–112)
CHOLEST SERPL-MCNC: 255 MG/DL (ref ?–200)
CO2 SERPL-SCNC: 28 MMOL/L (ref 21–32)
CREAT BLD-MCNC: 0.91 MG/DL
EST. AVERAGE GLUCOSE BLD GHB EST-MCNC: 117 MG/DL (ref 68–126)
FASTING PATIENT LIPID ANSWER: YES
FASTING STATUS PATIENT QL REPORTED: YES
GLOBULIN PLAS-MCNC: 3.7 G/DL (ref 2.8–4.4)
GLUCOSE BLD-MCNC: 98 MG/DL (ref 70–99)
HBA1C MFR BLD: 5.7 % (ref ?–5.7)
HDLC SERPL-MCNC: 51 MG/DL (ref 40–59)
LDLC SERPL CALC-MCNC: 174 MG/DL (ref ?–100)
NONHDLC SERPL-MCNC: 204 MG/DL (ref ?–130)
OSMOLALITY SERPL CALC.SUM OF ELEC: 289 MOSM/KG (ref 275–295)
POTASSIUM SERPL-SCNC: 4.1 MMOL/L (ref 3.5–5.1)
PROT SERPL-MCNC: 7.8 G/DL (ref 6.4–8.2)
SODIUM SERPL-SCNC: 139 MMOL/L (ref 136–145)
TRIGL SERPL-MCNC: 165 MG/DL (ref 30–149)
VIT D+METAB SERPL-MCNC: 29.6 NG/ML (ref 30–100)
VLDLC SERPL CALC-MCNC: 33 MG/DL (ref 0–30)

## 2022-07-08 PROCEDURE — 80061 LIPID PANEL: CPT

## 2022-07-08 PROCEDURE — 82306 VITAMIN D 25 HYDROXY: CPT

## 2022-07-08 PROCEDURE — 83036 HEMOGLOBIN GLYCOSYLATED A1C: CPT

## 2022-07-08 PROCEDURE — 80053 COMPREHEN METABOLIC PANEL: CPT

## 2022-07-08 PROCEDURE — 36415 COLL VENOUS BLD VENIPUNCTURE: CPT

## 2022-07-14 ENCOUNTER — OFFICE VISIT (OUTPATIENT)
Dept: INTERNAL MEDICINE CLINIC | Facility: CLINIC | Age: 62
End: 2022-07-14
Payer: COMMERCIAL

## 2022-07-14 ENCOUNTER — HOSPITAL ENCOUNTER (OUTPATIENT)
Dept: ULTRASOUND IMAGING | Facility: HOSPITAL | Age: 62
Discharge: HOME | End: 2022-07-14
Attending: INTERNAL MEDICINE
Payer: COMMERCIAL

## 2022-07-14 VITALS
DIASTOLIC BLOOD PRESSURE: 84 MMHG | OXYGEN SATURATION: 98 % | HEART RATE: 76 BPM | WEIGHT: 158 LBS | BODY MASS INDEX: 28 KG/M2 | SYSTOLIC BLOOD PRESSURE: 150 MMHG

## 2022-07-14 DIAGNOSIS — E03.9 HYPOTHYROIDISM, UNSPECIFIED TYPE: ICD-10-CM

## 2022-07-14 DIAGNOSIS — R73.03 PREDIABETES: ICD-10-CM

## 2022-07-14 DIAGNOSIS — Z12.31 ENCOUNTER FOR SCREENING MAMMOGRAM FOR MALIGNANT NEOPLASM OF BREAST: Primary | ICD-10-CM

## 2022-07-14 DIAGNOSIS — Z00.00 ROUTINE HEALTH MAINTENANCE: ICD-10-CM

## 2022-07-14 DIAGNOSIS — I10 HYPERTENSION, BENIGN: ICD-10-CM

## 2022-07-14 DIAGNOSIS — E55.9 VITAMIN D DEFICIENCY: ICD-10-CM

## 2022-07-14 DIAGNOSIS — E78.00 HYPERCHOLESTEROLEMIA: ICD-10-CM

## 2022-07-14 DIAGNOSIS — M79.89 RIGHT LEG SWELLING: ICD-10-CM

## 2022-07-14 PROCEDURE — 3079F DIAST BP 80-89 MM HG: CPT | Performed by: INTERNAL MEDICINE

## 2022-07-14 PROCEDURE — 3077F SYST BP >= 140 MM HG: CPT | Performed by: INTERNAL MEDICINE

## 2022-07-14 PROCEDURE — 99396 PREV VISIT EST AGE 40-64: CPT | Performed by: INTERNAL MEDICINE

## 2022-07-14 PROCEDURE — 93971 EXTREMITY STUDY: CPT | Performed by: INTERNAL MEDICINE

## 2022-07-14 PROCEDURE — 99213 OFFICE O/P EST LOW 20 MIN: CPT | Performed by: INTERNAL MEDICINE

## 2022-07-14 RX ORDER — ATENOLOL 50 MG/1
50 TABLET ORAL DAILY
Qty: 90 TABLET | Refills: 3 | Status: SHIPPED | OUTPATIENT
Start: 2022-07-14

## 2022-07-14 RX ORDER — LEVOTHYROXINE SODIUM 0.03 MG/1
25 TABLET ORAL
Qty: 90 TABLET | Refills: 3 | Status: SHIPPED | OUTPATIENT
Start: 2022-07-14

## 2022-08-12 RX ORDER — ATENOLOL 50 MG/1
50 TABLET ORAL DAILY
Qty: 90 TABLET | Refills: 3 | OUTPATIENT
Start: 2022-08-12

## 2022-08-12 RX ORDER — LEVOTHYROXINE SODIUM 0.03 MG/1
25 TABLET ORAL
Qty: 90 TABLET | Refills: 3 | OUTPATIENT
Start: 2022-08-12

## 2022-08-14 ENCOUNTER — PATIENT MESSAGE (OUTPATIENT)
Dept: INTERNAL MEDICINE CLINIC | Facility: CLINIC | Age: 62
End: 2022-08-14

## 2022-08-14 RX ORDER — ATENOLOL 50 MG/1
50 TABLET ORAL DAILY
Qty: 90 TABLET | Refills: 3 | Status: CANCELLED | OUTPATIENT
Start: 2022-08-14

## 2022-08-16 RX ORDER — ATENOLOL 50 MG/1
50 TABLET ORAL DAILY
Qty: 90 TABLET | Refills: 3 | Status: SHIPPED | OUTPATIENT
Start: 2022-08-16

## 2022-08-16 NOTE — TELEPHONE ENCOUNTER
From: Yohan Sensing  To: Javad Mccoy MD  Sent: 8/14/2022 10:28 PM CDT  Subject: Refill of atenolol     Dr Mark Le, my refill from July was only filled with 30 pills not 90 and [facility]. I only have 4 pills left so I cannot wait until then. Will you please submit a new 90 day refill.  Thank you Giselle

## 2022-10-28 ENCOUNTER — HOSPITAL ENCOUNTER (OUTPATIENT)
Dept: MAMMOGRAPHY | Age: 62
Discharge: HOME | End: 2022-10-28
Attending: INTERNAL MEDICINE
Payer: COMMERCIAL

## 2022-10-28 DIAGNOSIS — Z12.31 ENCOUNTER FOR SCREENING MAMMOGRAM FOR MALIGNANT NEOPLASM OF BREAST: ICD-10-CM

## 2022-10-28 PROCEDURE — 77063 BREAST TOMOSYNTHESIS BI: CPT | Performed by: INTERNAL MEDICINE

## 2022-10-28 PROCEDURE — 77067 SCR MAMMO BI INCL CAD: CPT | Performed by: INTERNAL MEDICINE

## 2023-09-28 ENCOUNTER — OFFICE VISIT (OUTPATIENT)
Dept: INTERNAL MEDICINE CLINIC | Facility: CLINIC | Age: 63
End: 2023-09-28

## 2023-09-28 VITALS
TEMPERATURE: 98 F | SYSTOLIC BLOOD PRESSURE: 118 MMHG | DIASTOLIC BLOOD PRESSURE: 86 MMHG | HEIGHT: 63.5 IN | WEIGHT: 157.19 LBS | HEART RATE: 60 BPM | BODY MASS INDEX: 27.51 KG/M2 | OXYGEN SATURATION: 99 %

## 2023-09-28 DIAGNOSIS — R73.03 PREDIABETES: ICD-10-CM

## 2023-09-28 DIAGNOSIS — I10 HYPERTENSION, BENIGN: ICD-10-CM

## 2023-09-28 DIAGNOSIS — E78.00 HYPERCHOLESTEROLEMIA: ICD-10-CM

## 2023-09-28 DIAGNOSIS — Z12.31 ENCOUNTER FOR SCREENING MAMMOGRAM FOR MALIGNANT NEOPLASM OF BREAST: ICD-10-CM

## 2023-09-28 DIAGNOSIS — E03.9 HYPOTHYROIDISM, UNSPECIFIED TYPE: ICD-10-CM

## 2023-09-28 DIAGNOSIS — E55.9 VITAMIN D DEFICIENCY: ICD-10-CM

## 2023-09-28 DIAGNOSIS — Z00.00 ROUTINE HEALTH MAINTENANCE: ICD-10-CM

## 2023-09-28 DIAGNOSIS — Z12.4 SCREENING FOR CERVICAL CANCER: Primary | ICD-10-CM

## 2023-09-28 PROCEDURE — 3079F DIAST BP 80-89 MM HG: CPT | Performed by: INTERNAL MEDICINE

## 2023-09-28 PROCEDURE — 99396 PREV VISIT EST AGE 40-64: CPT | Performed by: INTERNAL MEDICINE

## 2023-09-28 PROCEDURE — 3074F SYST BP LT 130 MM HG: CPT | Performed by: INTERNAL MEDICINE

## 2023-09-28 PROCEDURE — 3008F BODY MASS INDEX DOCD: CPT | Performed by: INTERNAL MEDICINE

## 2023-10-03 LAB — HPV I/H RISK 1 DNA SPEC QL NAA+PROBE: NEGATIVE

## 2023-12-14 RX ORDER — ATENOLOL 50 MG/1
50 TABLET ORAL DAILY
Qty: 30 TABLET | Refills: 0 | Status: SHIPPED | OUTPATIENT
Start: 2023-12-14

## 2023-12-14 RX ORDER — LEVOTHYROXINE SODIUM 0.03 MG/1
25 TABLET ORAL
Qty: 30 TABLET | Refills: 0 | Status: SHIPPED | OUTPATIENT
Start: 2023-12-14

## 2023-12-14 NOTE — TELEPHONE ENCOUNTER
Refill request is for a maintenance medication and has met the criteria specified in the Ambulatory Medication Refill Standing Order for eligibility, visits, laboratory, alerts and was sent to the requested pharmacy. Requested Prescriptions     Signed Prescriptions Disp Refills    atenolol 50 MG Oral Tab 30 tablet 0     Sig: TAKE 1 TABLET BY MOUTH EVERY DAY     Authorizing Provider: Eugenio Liu     Ordering User: Heidi Lorenzo    levothyroxine 25 MCG Oral Tab 30 tablet 0     Sig: TAKE 1 TABLET BY MOUTH BEFORE BREAKFAST.      Authorizing Provider: Eugenio Liu     Ordering User: Heidi Lorenzo     Reminder sent with refill for pt to complete labs

## 2023-12-19 ENCOUNTER — HOSPITAL ENCOUNTER (OUTPATIENT)
Dept: MAMMOGRAPHY | Facility: HOSPITAL | Age: 63
Discharge: HOME | End: 2023-12-19
Attending: INTERNAL MEDICINE
Payer: COMMERCIAL

## 2023-12-19 ENCOUNTER — LAB ENCOUNTER (OUTPATIENT)
Dept: LAB | Facility: HOSPITAL | Age: 63
End: 2023-12-19
Attending: INTERNAL MEDICINE
Payer: COMMERCIAL

## 2023-12-19 DIAGNOSIS — Z12.31 ENCOUNTER FOR SCREENING MAMMOGRAM FOR MALIGNANT NEOPLASM OF BREAST: ICD-10-CM

## 2023-12-19 DIAGNOSIS — E55.9 VITAMIN D DEFICIENCY: ICD-10-CM

## 2023-12-19 DIAGNOSIS — I10 HYPERTENSION, BENIGN: ICD-10-CM

## 2023-12-19 DIAGNOSIS — E78.00 HYPERCHOLESTEROLEMIA: ICD-10-CM

## 2023-12-19 LAB
ALBUMIN SERPL-MCNC: 4.5 G/DL (ref 3.2–4.8)
ALBUMIN/GLOB SERPL: 1.5 {RATIO} (ref 1–2)
ALP LIVER SERPL-CCNC: 85 U/L
ALT SERPL-CCNC: 34 U/L
ANION GAP SERPL CALC-SCNC: 2 MMOL/L (ref 0–18)
AST SERPL-CCNC: 25 U/L (ref ?–34)
BASOPHILS # BLD AUTO: 0.04 X10(3) UL (ref 0–0.2)
BASOPHILS NFR BLD AUTO: 0.5 %
BILIRUB SERPL-MCNC: 0.7 MG/DL (ref 0.2–1.1)
BUN BLD-MCNC: 14 MG/DL (ref 9–23)
BUN/CREAT SERPL: 15.2 (ref 10–20)
CALCIUM BLD-MCNC: 10 MG/DL (ref 8.7–10.4)
CHLORIDE SERPL-SCNC: 108 MMOL/L (ref 98–112)
CHOLEST SERPL-MCNC: 250 MG/DL (ref ?–200)
CO2 SERPL-SCNC: 29 MMOL/L (ref 21–32)
CREAT BLD-MCNC: 0.92 MG/DL
DEPRECATED RDW RBC AUTO: 42.8 FL (ref 35.1–46.3)
EGFRCR SERPLBLD CKD-EPI 2021: 71 ML/MIN/1.73M2 (ref 60–?)
EOSINOPHIL # BLD AUTO: 0.13 X10(3) UL (ref 0–0.7)
EOSINOPHIL NFR BLD AUTO: 1.7 %
ERYTHROCYTE [DISTWIDTH] IN BLOOD BY AUTOMATED COUNT: 13.1 % (ref 11–15)
EST. AVERAGE GLUCOSE BLD GHB EST-MCNC: 123 MG/DL (ref 68–126)
FASTING PATIENT LIPID ANSWER: YES
FASTING STATUS PATIENT QL REPORTED: YES
GLOBULIN PLAS-MCNC: 3 G/DL (ref 2.8–4.4)
GLUCOSE BLD-MCNC: 96 MG/DL (ref 70–99)
HBA1C MFR BLD: 5.9 % (ref ?–5.7)
HCT VFR BLD AUTO: 40.2 %
HDLC SERPL-MCNC: 55 MG/DL (ref 40–59)
HGB BLD-MCNC: 13.5 G/DL
IMM GRANULOCYTES # BLD AUTO: 0.02 X10(3) UL (ref 0–1)
IMM GRANULOCYTES NFR BLD: 0.3 %
LDLC SERPL CALC-MCNC: 164 MG/DL (ref ?–100)
LYMPHOCYTES # BLD AUTO: 2.03 X10(3) UL (ref 1–4)
LYMPHOCYTES NFR BLD AUTO: 26.4 %
MCH RBC QN AUTO: 30.1 PG (ref 26–34)
MCHC RBC AUTO-ENTMCNC: 33.6 G/DL (ref 31–37)
MCV RBC AUTO: 89.5 FL
MONOCYTES # BLD AUTO: 0.54 X10(3) UL (ref 0.1–1)
MONOCYTES NFR BLD AUTO: 7 %
NEUTROPHILS # BLD AUTO: 4.93 X10 (3) UL (ref 1.5–7.7)
NEUTROPHILS # BLD AUTO: 4.93 X10(3) UL (ref 1.5–7.7)
NEUTROPHILS NFR BLD AUTO: 64.1 %
NONHDLC SERPL-MCNC: 195 MG/DL (ref ?–130)
OSMOLALITY SERPL CALC.SUM OF ELEC: 288 MOSM/KG (ref 275–295)
PLATELET # BLD AUTO: 314 10(3)UL (ref 150–450)
POTASSIUM SERPL-SCNC: 3.9 MMOL/L (ref 3.5–5.1)
PROT SERPL-MCNC: 7.5 G/DL (ref 5.7–8.2)
RBC # BLD AUTO: 4.49 X10(6)UL
SODIUM SERPL-SCNC: 139 MMOL/L (ref 136–145)
TRIGL SERPL-MCNC: 170 MG/DL (ref 30–149)
TSI SER-ACNC: 4.49 MIU/ML (ref 0.55–4.78)
VIT D+METAB SERPL-MCNC: 30.9 NG/ML (ref 30–100)
VLDLC SERPL CALC-MCNC: 34 MG/DL (ref 0–30)
WBC # BLD AUTO: 7.7 X10(3) UL (ref 4–11)

## 2023-12-19 PROCEDURE — 82306 VITAMIN D 25 HYDROXY: CPT

## 2023-12-19 PROCEDURE — 77063 BREAST TOMOSYNTHESIS BI: CPT | Performed by: INTERNAL MEDICINE

## 2023-12-19 PROCEDURE — 77067 SCR MAMMO BI INCL CAD: CPT | Performed by: INTERNAL MEDICINE

## 2023-12-19 PROCEDURE — 36415 COLL VENOUS BLD VENIPUNCTURE: CPT

## 2023-12-19 PROCEDURE — 80061 LIPID PANEL: CPT

## 2023-12-19 PROCEDURE — 84443 ASSAY THYROID STIM HORMONE: CPT | Performed by: INTERNAL MEDICINE

## 2023-12-19 PROCEDURE — 83036 HEMOGLOBIN GLYCOSYLATED A1C: CPT | Performed by: INTERNAL MEDICINE

## 2023-12-19 PROCEDURE — 80053 COMPREHEN METABOLIC PANEL: CPT

## 2023-12-19 PROCEDURE — 85025 COMPLETE CBC W/AUTO DIFF WBC: CPT

## 2023-12-31 ENCOUNTER — TELEPHONE (OUTPATIENT)
Dept: INTERNAL MEDICINE CLINIC | Facility: CLINIC | Age: 63
End: 2023-12-31

## 2023-12-31 NOTE — TELEPHONE ENCOUNTER
Please call pt with labs.  Her hemoglobin A1c is creeping up -- still in prediabetes range.  LDL still high; triglycerides creeping up.    Based on her cardiac risk factors, I would like to discuss starting a low dose of cholesterol medication.  Please ask pt to schedule at least a phone visit

## 2024-01-02 NOTE — TELEPHONE ENCOUNTER
, please call patient and schedule a telephone visit with Dr Crystal. Patient aware she will be called for this.

## 2024-01-02 NOTE — TELEPHONE ENCOUNTER
Patient called and relayed Dr Crystal message. Patient verbalized understanding with no further questions noted.

## 2024-01-10 RX ORDER — ATENOLOL 50 MG/1
50 TABLET ORAL DAILY
Qty: 90 TABLET | Refills: 3 | Status: SHIPPED | OUTPATIENT
Start: 2024-01-10

## 2024-01-10 RX ORDER — LEVOTHYROXINE SODIUM 0.03 MG/1
25 TABLET ORAL
Qty: 90 TABLET | Refills: 3 | Status: SHIPPED | OUTPATIENT
Start: 2024-01-10

## 2024-01-10 NOTE — TELEPHONE ENCOUNTER
Refill request is for a maintenance medication and has met the criteria specified in the Ambulatory Medication Refill Standing Order for eligibility, visits, laboratory, alerts and was sent to the requested pharmacy.    Requested Prescriptions     Signed Prescriptions Disp Refills    levothyroxine 25 MCG Oral Tab 90 tablet 3     Sig: Take 1 tablet (25 mcg total) by mouth before breakfast.     Authorizing Provider: KAMRAN GRANT     Ordering User: REX CASTRO    atenolol 50 MG Oral Tab 90 tablet 3     Sig: Take 1 tablet (50 mg total) by mouth daily.     Authorizing Provider: KAMRAN GRANT     Ordering User: REX CASTRO

## 2024-02-08 ENCOUNTER — VIRTUAL PHONE E/M (OUTPATIENT)
Dept: INTERNAL MEDICINE CLINIC | Facility: CLINIC | Age: 64
End: 2024-02-08

## 2024-02-08 DIAGNOSIS — C44.92 SQUAMOUS CELL SKIN CANCER: ICD-10-CM

## 2024-02-08 DIAGNOSIS — I10 HYPERTENSION, BENIGN: ICD-10-CM

## 2024-02-08 DIAGNOSIS — E03.9 HYPOTHYROIDISM, UNSPECIFIED TYPE: ICD-10-CM

## 2024-02-08 DIAGNOSIS — E55.9 VITAMIN D DEFICIENCY: ICD-10-CM

## 2024-02-08 DIAGNOSIS — R73.03 PREDIABETES: ICD-10-CM

## 2024-02-08 DIAGNOSIS — E78.00 HYPERCHOLESTEROLEMIA: Primary | ICD-10-CM

## 2024-02-08 NOTE — PROGRESS NOTES
Virtual Telephone Check-In    Jessica Hicks verbally consents to a Virtual/Telephone Check-In visit on 02/08/24.  Patient has been referred to the [facility] website at [email or web address] to review the yearly Consent to Treat document.    Patient understands and accepts financial responsibility for any deductible, co-insurance and/or co-pays associated with this service.    Duration of the service: 22 minutes      Summary of topics discussed:         Discussion of hypercholesterolemia.  LDL persistently elevated.    Pt admits that sweets are her weakness.  After she saw her labs, she stopped eating desserts at night.  Stopped baking so much.  Was drinking 2 cafe lattes/day -- decreased to one (unsweetened)  She has started taking the stairs at work. She sits all day at work.    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  Normal HDL.  TG's intermittently high  -Pt's 10-yr ASCVD risk is ~5%.  -discussed starting a statin  -pt would really like to work on diet and exercise.  She is finally motivated to make positive changes -- has drastically reduced her sugar intake.  Encouraged her to focus on lean protein and vegetables and fruit.  Decrease simple carbs.  Encouraged increased movement throughout the day and formal exercise with some weight training.  -pt is due for PE in 9/2024 -- will repeat labs prior to that    Prediabetes  -A1c 5.9  -pt to make changes as above  -repeat in 9/2024    Squamous cell skin ca R hand  -dx'ed 1/24/24  -scheduled for Moh's surgery      Vicky Crystal MD

## 2024-12-20 ENCOUNTER — LAB REQUISITION (OUTPATIENT)
Dept: LAB | Age: 64
End: 2024-12-20

## 2024-12-20 DIAGNOSIS — G47.00 INSOMNIA, UNSPECIFIED: ICD-10-CM

## 2024-12-20 DIAGNOSIS — Z13.0 ENCOUNTER FOR SCREENING FOR DISEASES OF THE BLOOD AND BLOOD-FORMING ORGANS AND CERTAIN DISORDERS INVOLVING THE IMMUNE MECHANISM: ICD-10-CM

## 2024-12-20 DIAGNOSIS — E03.9 HYPOTHYROIDISM, UNSPECIFIED: ICD-10-CM

## 2024-12-20 DIAGNOSIS — N95.1 MENOPAUSAL AND FEMALE CLIMACTERIC STATES: ICD-10-CM

## 2024-12-20 DIAGNOSIS — Z13.21 ENCOUNTER FOR SCREENING FOR NUTRITIONAL DISORDER: ICD-10-CM

## 2024-12-20 DIAGNOSIS — L30.9 DERMATITIS, UNSPECIFIED: ICD-10-CM

## 2024-12-20 DIAGNOSIS — Z13.29 ENCOUNTER FOR SCREENING FOR OTHER SUSPECTED ENDOCRINE DISORDER: ICD-10-CM

## 2024-12-20 DIAGNOSIS — R63.5 ABNORMAL WEIGHT GAIN: ICD-10-CM

## 2024-12-20 DIAGNOSIS — R60.0 LOCALIZED EDEMA: ICD-10-CM

## 2024-12-20 DIAGNOSIS — Z13.228 ENCOUNTER FOR SCREENING FOR OTHER METABOLIC DISORDERS: ICD-10-CM

## 2024-12-20 DIAGNOSIS — I10 ESSENTIAL (PRIMARY) HYPERTENSION: ICD-10-CM

## 2024-12-20 PROCEDURE — 82306 VITAMIN D 25 HYDROXY: CPT | Performed by: CLINICAL MEDICAL LABORATORY

## 2024-12-20 PROCEDURE — 82525 ASSAY OF COPPER: CPT | Performed by: CLINICAL MEDICAL LABORATORY

## 2024-12-20 PROCEDURE — PSEU8300 THYROXINE: Performed by: CLINICAL MEDICAL LABORATORY

## 2024-12-20 PROCEDURE — 80061 LIPID PANEL: CPT | Performed by: CLINICAL MEDICAL LABORATORY

## 2024-12-20 PROCEDURE — PSEU8187 CORTISOL: Performed by: CLINICAL MEDICAL LABORATORY

## 2024-12-20 PROCEDURE — 82977 ASSAY OF GGT: CPT | Performed by: CLINICAL MEDICAL LABORATORY

## 2024-12-20 PROCEDURE — PSEU8191 DHEA SULFATE: Performed by: CLINICAL MEDICAL LABORATORY

## 2024-12-20 PROCEDURE — 83018 HEAVY METAL QUAN EACH NES: CPT | Performed by: CLINICAL MEDICAL LABORATORY

## 2024-12-20 PROCEDURE — 83090 ASSAY OF HOMOCYSTEINE: CPT | Performed by: CLINICAL MEDICAL LABORATORY

## 2024-12-20 PROCEDURE — 82533 TOTAL CORTISOL: CPT | Performed by: CLINICAL MEDICAL LABORATORY

## 2024-12-20 PROCEDURE — PSEU8171 VITAMIN B12 AND FOLATE: Performed by: CLINICAL MEDICAL LABORATORY

## 2024-12-20 PROCEDURE — PSEU8263 GGT: Performed by: CLINICAL MEDICAL LABORATORY

## 2024-12-20 PROCEDURE — PSEU8259 FERRITIN: Performed by: CLINICAL MEDICAL LABORATORY

## 2024-12-20 PROCEDURE — 82784 ASSAY IGA/IGD/IGG/IGM EACH: CPT | Performed by: CLINICAL MEDICAL LABORATORY

## 2024-12-20 PROCEDURE — 86141 C-REACTIVE PROTEIN HS: CPT | Performed by: CLINICAL MEDICAL LABORATORY

## 2024-12-20 PROCEDURE — PSEU8196 FREE T3: Performed by: CLINICAL MEDICAL LABORATORY

## 2024-12-20 PROCEDURE — 83735 ASSAY OF MAGNESIUM: CPT | Performed by: CLINICAL MEDICAL LABORATORY

## 2024-12-20 PROCEDURE — 84436 ASSAY OF TOTAL THYROXINE: CPT | Performed by: CLINICAL MEDICAL LABORATORY

## 2024-12-20 PROCEDURE — PSEU8250 COMPREHENSIVE METABOLIC PANEL: Performed by: CLINICAL MEDICAL LABORATORY

## 2024-12-20 PROCEDURE — 86376 MICROSOMAL ANTIBODY EACH: CPT | Performed by: CLINICAL MEDICAL LABORATORY

## 2024-12-20 PROCEDURE — 82607 VITAMIN B-12: CPT | Performed by: CLINICAL MEDICAL LABORATORY

## 2024-12-20 PROCEDURE — 84255 ASSAY OF SELENIUM: CPT | Performed by: CLINICAL MEDICAL LABORATORY

## 2024-12-20 PROCEDURE — 84550 ASSAY OF BLOOD/URIC ACID: CPT | Performed by: CLINICAL MEDICAL LABORATORY

## 2024-12-20 PROCEDURE — PSEU8115 INSULIN LEVEL, FASTING: Performed by: CLINICAL MEDICAL LABORATORY

## 2024-12-20 PROCEDURE — 84443 ASSAY THYROID STIM HORMONE: CPT | Performed by: CLINICAL MEDICAL LABORATORY

## 2024-12-20 PROCEDURE — 83036 HEMOGLOBIN GLYCOSYLATED A1C: CPT | Performed by: CLINICAL MEDICAL LABORATORY

## 2024-12-20 PROCEDURE — PSEU8268 LACTATE DEHYDROGENASE: Performed by: CLINICAL MEDICAL LABORATORY

## 2024-12-20 PROCEDURE — 82785 ASSAY OF IGE: CPT | Performed by: CLINICAL MEDICAL LABORATORY

## 2024-12-20 PROCEDURE — 83540 ASSAY OF IRON: CPT | Performed by: CLINICAL MEDICAL LABORATORY

## 2024-12-20 PROCEDURE — 83615 LACTATE (LD) (LDH) ENZYME: CPT | Performed by: CLINICAL MEDICAL LABORATORY

## 2024-12-20 PROCEDURE — PSEU8506 C REACTIVE PROTEIN HIGH SENSITIVITY: Performed by: CLINICAL MEDICAL LABORATORY

## 2024-12-20 PROCEDURE — PSEU8229 VITAMIN D -25 HYDROXY: Performed by: CLINICAL MEDICAL LABORATORY

## 2024-12-20 PROCEDURE — PSEU8538 IMMUNOGLOBULINS QUANTITATIVE: Performed by: CLINICAL MEDICAL LABORATORY

## 2024-12-20 PROCEDURE — 83525 ASSAY OF INSULIN: CPT | Performed by: CLINICAL MEDICAL LABORATORY

## 2024-12-20 PROCEDURE — 80053 COMPREHEN METABOLIC PANEL: CPT | Performed by: CLINICAL MEDICAL LABORATORY

## 2024-12-20 PROCEDURE — 82542 COL CHROMOTOGRAPHY QUAL/QUAN: CPT | Performed by: CLINICAL MEDICAL LABORATORY

## 2024-12-20 PROCEDURE — 82728 ASSAY OF FERRITIN: CPT | Performed by: CLINICAL MEDICAL LABORATORY

## 2024-12-20 PROCEDURE — PSEU8536 IMMUNOGLOBULIN E: Performed by: CLINICAL MEDICAL LABORATORY

## 2024-12-20 PROCEDURE — 85652 RBC SED RATE AUTOMATED: CPT | Performed by: CLINICAL MEDICAL LABORATORY

## 2024-12-20 PROCEDURE — 86800 THYROGLOBULIN ANTIBODY: CPT | Performed by: CLINICAL MEDICAL LABORATORY

## 2024-12-20 PROCEDURE — PSEU10042 COPPER, BLOOD: Performed by: CLINICAL MEDICAL LABORATORY

## 2024-12-20 PROCEDURE — PSEU8270 LIPID PANEL WITHOUT REFLEX: Performed by: CLINICAL MEDICAL LABORATORY

## 2024-12-20 PROCEDURE — PSEU8556 THYROID ANTIBODIES: Performed by: CLINICAL MEDICAL LABORATORY

## 2024-12-20 PROCEDURE — 82746 ASSAY OF FOLIC ACID SERUM: CPT | Performed by: CLINICAL MEDICAL LABORATORY

## 2024-12-20 PROCEDURE — 82627 DEHYDROEPIANDROSTERONE: CPT | Performed by: CLINICAL MEDICAL LABORATORY

## 2024-12-20 PROCEDURE — PSEU8299 THYROID STIMULATING HORMONE: Performed by: CLINICAL MEDICAL LABORATORY

## 2024-12-20 PROCEDURE — 83550 IRON BINDING TEST: CPT | Performed by: CLINICAL MEDICAL LABORATORY

## 2024-12-20 PROCEDURE — PSEU8904 ZINC: Performed by: CLINICAL MEDICAL LABORATORY

## 2024-12-20 PROCEDURE — 84630 ASSAY OF ZINC: CPT | Performed by: CLINICAL MEDICAL LABORATORY

## 2024-12-20 PROCEDURE — PSEU8266 GLYCOHEMOGLOBIN: Performed by: CLINICAL MEDICAL LABORATORY

## 2024-12-20 PROCEDURE — PSEU8303 URIC ACID: Performed by: CLINICAL MEDICAL LABORATORY

## 2024-12-20 PROCEDURE — PSEU8203 IRON AND TOTAL IRON BINDING CAPACITY: Performed by: CLINICAL MEDICAL LABORATORY

## 2024-12-20 PROCEDURE — 85379 FIBRIN DEGRADATION QUANT: CPT | Performed by: CLINICAL MEDICAL LABORATORY

## 2024-12-20 PROCEDURE — 84481 FREE ASSAY (FT-3): CPT | Performed by: CLINICAL MEDICAL LABORATORY

## 2024-12-21 LAB
25(OH)D3+25(OH)D2 SERPL-MCNC: 21 NG/ML (ref 30–100)
ALBUMIN SERPL-MCNC: 4.2 G/DL (ref 3.4–5)
ALBUMIN/GLOB SERPL: 1.2 {RATIO} (ref 1–2.4)
ALP SERPL-CCNC: 99 UNITS/L (ref 45–117)
ALT SERPL-CCNC: 53 UNITS/L
ANION GAP SERPL CALC-SCNC: 12 MMOL/L (ref 7–19)
AST SERPL-CCNC: 31 UNITS/L
BILIRUB SERPL-MCNC: 0.7 MG/DL (ref 0.2–1)
BUN SERPL-MCNC: 14 MG/DL (ref 6–20)
BUN/CREAT SERPL: 15 (ref 7–25)
CALCIUM SERPL-MCNC: 9.9 MG/DL (ref 8.4–10.2)
CHLORIDE SERPL-SCNC: 103 MMOL/L (ref 97–110)
CHOLEST SERPL-MCNC: 264 MG/DL
CHOLEST/HDLC SERPL: 4 {RATIO}
CO2 SERPL-SCNC: 26 MMOL/L (ref 21–32)
CORTIS SERPL-MCNC: 10.3 MCG/DL (ref 3.4–22.5)
CREAT SERPL-MCNC: 0.92 MG/DL (ref 0.51–0.95)
CRP SERPL HS-MCNC: 7.25 MG/L
D DIMER PPP FEU-MCNC: 0.42 MG/L (FEU)
EGFRCR SERPLBLD CKD-EPI 2021: 70 ML/MIN/{1.73_M2}
ERYTHROCYTE [SEDIMENTATION RATE] IN BLOOD BY WESTERGREN METHOD: 16 MM/HR (ref 0–20)
FASTING DURATION TIME PATIENT: ABNORMAL H
FASTING DURATION TIME PATIENT: NORMAL H
FERRITIN SERPL-MCNC: 313 NG/ML (ref 8–252)
FOLATE SERPL-MCNC: 12.3 NG/ML
GGT SERPL-CCNC: 45 UNITS/L (ref 5–55)
GLOBULIN SER-MCNC: 3.6 G/DL (ref 2–4)
GLUCOSE SERPL-MCNC: 101 MG/DL (ref 70–99)
HBA1C MFR BLD: 5.6 % (ref 4.5–5.6)
HCYS SERPL-SCNC: 10.9 MCMOL/L
HDLC SERPL-MCNC: 66 MG/DL
IGA SERPL-MCNC: 299 MG/DL (ref 70–400)
IGE SERPL-ACNC: 16.7 IUNITS/ML
IGG SERPL-MCNC: 1420 MG/DL (ref 700–1600)
IGM SERPL-MCNC: 69 MG/DL (ref 40–230)
INSULIN P FAST SERPL-ACNC: 23 MUNITS/L (ref 3–28)
IRON SATN MFR SERPL: 29 % (ref 15–45)
IRON SERPL-MCNC: 99 MCG/DL (ref 50–170)
LDH SERPL L TO P-CCNC: 179 UNITS/L (ref 82–240)
LDLC SERPL CALC-MCNC: 162 MG/DL
NONHDLC SERPL-MCNC: 198 MG/DL
POTASSIUM SERPL-SCNC: 4.5 MMOL/L (ref 3.4–5.1)
PROT SERPL-MCNC: 7.8 G/DL (ref 6.4–8.2)
SODIUM SERPL-SCNC: 136 MMOL/L (ref 135–145)
T3FREE SERPL-MCNC: 3 PG/ML (ref 2.2–4)
T4 SERPL-MCNC: 8.1 MCG/DL (ref 4.7–13.3)
THYROGLOB AB SERPL-ACNC: <0.9 IU/ML (ref 0–4)
THYROPEROXIDASE AB SERPL-ACNC: 40 UNITS/ML
TIBC SERPL-MCNC: 338 MCG/DL (ref 250–450)
TRIGL SERPL-MCNC: 178 MG/DL
TSH SERPL-ACNC: 3.07 MCUNITS/ML (ref 0.35–5)
URATE SERPL-MCNC: 6.7 MG/DL (ref 2.6–5.9)
VIT B12 SERPL-MCNC: 509 PG/ML (ref 211–911)

## 2024-12-23 LAB
COPPER SERPL-MCNC: 124 MCG/DL (ref 80–155)
SELENIUM SERPL-MCNC: 111.6 UG/L (ref 23–190)
ZINC SERPL-MCNC: 87 MCG/DL (ref 70–120)

## 2024-12-24 LAB
DHEA-S SERPL-MCNC: 116.8 MCG/DL (ref 8–391)
IODINE SERPL-MCNC: 61.5 UG/L (ref 40–92)

## 2024-12-25 LAB — MAGNESIUM RBC-MCNC: 6 MG/DL (ref 3.6–7.5)

## 2024-12-30 LAB — SERVICE CMNT-IMP: NORMAL

## 2025-01-17 ENCOUNTER — LAB REQUISITION (OUTPATIENT)
Dept: LAB | Age: 65
End: 2025-01-17

## 2025-01-17 DIAGNOSIS — E03.9 HYPOTHYROIDISM, UNSPECIFIED: ICD-10-CM

## 2025-01-17 DIAGNOSIS — L30.9 DERMATITIS, UNSPECIFIED: ICD-10-CM

## 2025-01-17 DIAGNOSIS — E78.2 MIXED HYPERLIPIDEMIA: ICD-10-CM

## 2025-01-17 DIAGNOSIS — R63.5 ABNORMAL WEIGHT GAIN: ICD-10-CM

## 2025-01-17 DIAGNOSIS — R79.89 OTHER SPECIFIED ABNORMAL FINDINGS OF BLOOD CHEMISTRY: ICD-10-CM

## 2025-01-17 DIAGNOSIS — R79.0 ABNORMAL LEVEL OF BLOOD MINERAL: ICD-10-CM

## 2025-01-17 DIAGNOSIS — N95.1 MENOPAUSAL AND FEMALE CLIMACTERIC STATES: ICD-10-CM

## 2025-01-17 DIAGNOSIS — Z13.0 ENCOUNTER FOR SCREENING FOR DISEASES OF THE BLOOD AND BLOOD-FORMING ORGANS AND CERTAIN DISORDERS INVOLVING THE IMMUNE MECHANISM: ICD-10-CM

## 2025-01-17 DIAGNOSIS — R60.0 LOCALIZED EDEMA: ICD-10-CM

## 2025-01-17 DIAGNOSIS — G47.00 INSOMNIA, UNSPECIFIED: ICD-10-CM

## 2025-01-17 DIAGNOSIS — I10 ESSENTIAL (PRIMARY) HYPERTENSION: ICD-10-CM

## 2025-01-17 PROCEDURE — 84481 FREE ASSAY (FT-3): CPT | Performed by: CLINICAL MEDICAL LABORATORY

## 2025-01-17 PROCEDURE — 81256 HFE GENE: CPT | Performed by: CLINICAL MEDICAL LABORATORY

## 2025-01-17 PROCEDURE — PSEU8196 FREE T3: Performed by: CLINICAL MEDICAL LABORATORY

## 2025-01-17 PROCEDURE — 82784 ASSAY IGA/IGD/IGG/IGM EACH: CPT | Performed by: CLINICAL MEDICAL LABORATORY

## 2025-01-17 PROCEDURE — 86364 TISS TRNSGLTMNASE EA IG CLAS: CPT | Performed by: CLINICAL MEDICAL LABORATORY

## 2025-01-17 PROCEDURE — 82172 ASSAY OF APOLIPOPROTEIN: CPT | Performed by: CLINICAL MEDICAL LABORATORY

## 2025-01-17 PROCEDURE — 83695 ASSAY OF LIPOPROTEIN(A): CPT | Performed by: CLINICAL MEDICAL LABORATORY

## 2025-01-17 PROCEDURE — 86141 C-REACTIVE PROTEIN HS: CPT | Performed by: CLINICAL MEDICAL LABORATORY

## 2025-01-17 PROCEDURE — PSEU8470 CELIAC DISEASE SCR 2Y AND OVER: Performed by: CLINICAL MEDICAL LABORATORY

## 2025-01-17 PROCEDURE — 85025 COMPLETE CBC W/AUTO DIFF WBC: CPT | Performed by: CLINICAL MEDICAL LABORATORY

## 2025-01-17 PROCEDURE — 83704 LIPOPROTEIN BLD QUAN PART: CPT | Performed by: CLINICAL MEDICAL LABORATORY

## 2025-01-17 PROCEDURE — 80061 LIPID PANEL: CPT | Performed by: CLINICAL MEDICAL LABORATORY

## 2025-01-17 PROCEDURE — 83698 ASSAY LIPOPROTEIN PLA2: CPT | Performed by: CLINICAL MEDICAL LABORATORY

## 2025-01-18 LAB
BASOPHILS # BLD: 0 K/MCL (ref 0–0.3)
BASOPHILS NFR BLD: 1 %
DEPRECATED RDW RBC: 43.9 FL (ref 39–50)
EOSINOPHIL # BLD: 0.1 K/MCL (ref 0–0.5)
EOSINOPHIL NFR BLD: 1 %
ERYTHROCYTE [DISTWIDTH] IN BLOOD: 13.2 % (ref 11–15)
HCT VFR BLD CALC: 43.8 % (ref 36–46.5)
HGB BLD-MCNC: 14.3 G/DL (ref 12–15.5)
IMM GRANULOCYTES # BLD AUTO: 0 K/MCL (ref 0–0.2)
IMM GRANULOCYTES # BLD: 0 %
LYMPHOCYTES # BLD: 1.9 K/MCL (ref 1–4)
LYMPHOCYTES NFR BLD: 28 %
MCH RBC QN AUTO: 29.7 PG (ref 26–34)
MCHC RBC AUTO-ENTMCNC: 32.6 G/DL (ref 32–36.5)
MCV RBC AUTO: 91.1 FL (ref 78–100)
MONOCYTES # BLD: 0.5 K/MCL (ref 0.3–0.9)
MONOCYTES NFR BLD: 7 %
NEUTROPHILS # BLD: 4.1 K/MCL (ref 1.8–7.7)
NEUTROPHILS NFR BLD: 63 %
NRBC BLD MANUAL-RTO: 0 /100 WBC
PLATELET # BLD AUTO: 347 K/MCL (ref 140–450)
RBC # BLD: 4.81 MIL/MCL (ref 4–5.2)
T3FREE SERPL-MCNC: 2.7 PG/ML (ref 2.2–4)
WBC # BLD: 6.5 K/MCL (ref 4.2–11)

## 2025-01-21 LAB
IGA SERPL-MCNC: 282 MG/DL (ref 70–400)
TTG IGA SER IA-ACNC: 1 U/ML

## 2025-01-22 LAB
HFE GENE MUT ANL BLD/T: NORMAL
HFE P.C282Y BLD/T QL: NOT DETECTED
HFE P.H63D BLD/T QL: NOT DETECTED
SERVICE CMNT-IMP: NORMAL

## 2025-01-27 LAB
REF LAB TEST NAME: NORMAL
SERVICE CMNT-IMP: NORMAL

## 2025-01-29 RX ORDER — LEVOTHYROXINE SODIUM 25 UG/1
25 TABLET ORAL
Qty: 90 TABLET | Refills: 0 | Status: SHIPPED | OUTPATIENT
Start: 2025-01-29

## 2025-01-29 RX ORDER — ATENOLOL 50 MG/1
50 TABLET ORAL DAILY
Qty: 90 TABLET | Refills: 0 | Status: SHIPPED | OUTPATIENT
Start: 2025-01-29

## 2025-01-29 NOTE — TELEPHONE ENCOUNTER
Upcoming PE scheduled for 2/13.  Short fill provided.     Refill request is for a maintenance medication and has met the criteria specified in the Ambulatory Medication Refill Standing Order for eligibility, visits, laboratory, alerts and was sent to the requested pharmacy.    Requested Prescriptions     Signed Prescriptions Disp Refills    atenolol 50 MG Oral Tab 90 tablet 0     Sig: TAKE 1 TABLET BY MOUTH EVERY DAY     Authorizing Provider: KAMRAN GRANT     Ordering User: DEANN MCKEON    levothyroxine 25 MCG Oral Tab 90 tablet 0     Sig: TAKE 1 TABLET BY MOUTH BEFORE BREAKFAST.     Authorizing Provider: KAMRAN GRANT     Ordering User: DEANN MCKEON

## 2025-02-14 ENCOUNTER — LAB REQUISITION (OUTPATIENT)
Dept: LAB | Age: 65
End: 2025-02-14

## 2025-02-14 DIAGNOSIS — E78.2 MIXED HYPERLIPIDEMIA: ICD-10-CM

## 2025-02-14 DIAGNOSIS — I10 ESSENTIAL (PRIMARY) HYPERTENSION: ICD-10-CM

## 2025-02-14 DIAGNOSIS — G47.00 INSOMNIA, UNSPECIFIED: ICD-10-CM

## 2025-02-14 DIAGNOSIS — R63.5 ABNORMAL WEIGHT GAIN: ICD-10-CM

## 2025-02-14 DIAGNOSIS — E03.9 HYPOTHYROIDISM, UNSPECIFIED: ICD-10-CM

## 2025-02-14 DIAGNOSIS — R60.0 LOCALIZED EDEMA: ICD-10-CM

## 2025-02-14 DIAGNOSIS — R79.82 ELEVATED C-REACTIVE PROTEIN (CRP): ICD-10-CM

## 2025-02-14 DIAGNOSIS — R79.0 ABNORMAL LEVEL OF BLOOD MINERAL: ICD-10-CM

## 2025-02-14 PROCEDURE — 85025 COMPLETE CBC W/AUTO DIFF WBC: CPT | Performed by: CLINICAL MEDICAL LABORATORY

## 2025-02-14 PROCEDURE — PSEU8270 LIPID PANEL WITHOUT REFLEX: Performed by: CLINICAL MEDICAL LABORATORY

## 2025-02-14 PROCEDURE — PSEU8266 GLYCOHEMOGLOBIN: Performed by: CLINICAL MEDICAL LABORATORY

## 2025-02-14 PROCEDURE — PSEU8263 GGT: Performed by: CLINICAL MEDICAL LABORATORY

## 2025-02-14 PROCEDURE — 80061 LIPID PANEL: CPT | Performed by: CLINICAL MEDICAL LABORATORY

## 2025-02-14 PROCEDURE — 83036 HEMOGLOBIN GLYCOSYLATED A1C: CPT | Performed by: CLINICAL MEDICAL LABORATORY

## 2025-02-14 PROCEDURE — PSEU8250 COMPREHENSIVE METABOLIC PANEL: Performed by: CLINICAL MEDICAL LABORATORY

## 2025-02-14 PROCEDURE — 80053 COMPREHEN METABOLIC PANEL: CPT | Performed by: CLINICAL MEDICAL LABORATORY

## 2025-02-14 PROCEDURE — PSEU8115 INSULIN LEVEL, FASTING: Performed by: CLINICAL MEDICAL LABORATORY

## 2025-02-14 PROCEDURE — 86141 C-REACTIVE PROTEIN HS: CPT | Performed by: CLINICAL MEDICAL LABORATORY

## 2025-02-14 PROCEDURE — 82784 ASSAY IGA/IGD/IGG/IGM EACH: CPT | Performed by: CLINICAL MEDICAL LABORATORY

## 2025-02-14 PROCEDURE — 82728 ASSAY OF FERRITIN: CPT | Performed by: CLINICAL MEDICAL LABORATORY

## 2025-02-14 PROCEDURE — PSEU8538 IMMUNOGLOBULINS QUANTITATIVE: Performed by: CLINICAL MEDICAL LABORATORY

## 2025-02-14 PROCEDURE — 82977 ASSAY OF GGT: CPT | Performed by: CLINICAL MEDICAL LABORATORY

## 2025-02-14 PROCEDURE — PSEU8259 FERRITIN: Performed by: CLINICAL MEDICAL LABORATORY

## 2025-02-14 PROCEDURE — PSEU8506 C REACTIVE PROTEIN HIGH SENSITIVITY: Performed by: CLINICAL MEDICAL LABORATORY

## 2025-02-14 PROCEDURE — 83525 ASSAY OF INSULIN: CPT | Performed by: CLINICAL MEDICAL LABORATORY

## 2025-02-15 LAB
ALBUMIN SERPL-MCNC: 4.1 G/DL (ref 3.4–5)
ALBUMIN/GLOB SERPL: 1.3 {RATIO} (ref 1–2.4)
ALP SERPL-CCNC: 101 UNITS/L (ref 45–117)
ALT SERPL-CCNC: 35 UNITS/L
ANION GAP SERPL CALC-SCNC: 8 MMOL/L (ref 7–19)
AST SERPL-CCNC: 21 UNITS/L
BASOPHILS # BLD: 0 K/MCL (ref 0–0.3)
BASOPHILS NFR BLD: 1 %
BILIRUB SERPL-MCNC: 0.5 MG/DL (ref 0.2–1)
BUN SERPL-MCNC: 15 MG/DL (ref 6–20)
BUN/CREAT SERPL: 19 (ref 7–25)
CALCIUM SERPL-MCNC: 9.9 MG/DL (ref 8.4–10.2)
CHLORIDE SERPL-SCNC: 105 MMOL/L (ref 97–110)
CHOLEST SERPL-MCNC: 210 MG/DL
CHOLEST/HDLC SERPL: 4 {RATIO}
CO2 SERPL-SCNC: 29 MMOL/L (ref 21–32)
CREAT SERPL-MCNC: 0.81 MG/DL (ref 0.51–0.95)
CRP SERPL HS-MCNC: 1.81 MG/L
DEPRECATED RDW RBC: 42.8 FL (ref 39–50)
EGFRCR SERPLBLD CKD-EPI 2021: 82 ML/MIN/{1.73_M2}
EOSINOPHIL # BLD: 0.1 K/MCL (ref 0–0.5)
EOSINOPHIL NFR BLD: 1 %
ERYTHROCYTE [DISTWIDTH] IN BLOOD: 12.8 % (ref 11–15)
FASTING DURATION TIME PATIENT: ABNORMAL H
FASTING DURATION TIME PATIENT: NORMAL H
FERRITIN SERPL-MCNC: 224 NG/ML (ref 8–252)
GGT SERPL-CCNC: 20 UNITS/L (ref 5–55)
GLOBULIN SER-MCNC: 3.2 G/DL (ref 2–4)
GLUCOSE SERPL-MCNC: 100 MG/DL (ref 70–99)
HBA1C MFR BLD: 5.4 % (ref 4.5–5.6)
HCT VFR BLD CALC: 44.1 % (ref 36–46.5)
HDLC SERPL-MCNC: 53 MG/DL
HGB BLD-MCNC: 14.4 G/DL (ref 12–15.5)
IGA SERPL-MCNC: 257 MG/DL (ref 70–400)
IGG SERPL-MCNC: 1410 MG/DL (ref 700–1600)
IGM SERPL-MCNC: 62 MG/DL (ref 40–230)
IMM GRANULOCYTES # BLD AUTO: 0 K/MCL (ref 0–0.2)
IMM GRANULOCYTES # BLD: 0 %
INSULIN P FAST SERPL-ACNC: 16 MUNITS/L (ref 3–28)
LDLC SERPL CALC-MCNC: 134 MG/DL
LYMPHOCYTES # BLD: 1.8 K/MCL (ref 1–4)
LYMPHOCYTES NFR BLD: 30 %
MCH RBC QN AUTO: 29.8 PG (ref 26–34)
MCHC RBC AUTO-ENTMCNC: 32.7 G/DL (ref 32–36.5)
MCV RBC AUTO: 91.1 FL (ref 78–100)
MONOCYTES # BLD: 0.4 K/MCL (ref 0.3–0.9)
MONOCYTES NFR BLD: 6 %
NEUTROPHILS # BLD: 3.6 K/MCL (ref 1.8–7.7)
NEUTROPHILS NFR BLD: 62 %
NONHDLC SERPL-MCNC: 157 MG/DL
NRBC BLD MANUAL-RTO: 0 /100 WBC
PLATELET # BLD AUTO: 306 K/MCL (ref 140–450)
POTASSIUM SERPL-SCNC: 4.4 MMOL/L (ref 3.4–5.1)
PROT SERPL-MCNC: 7.3 G/DL (ref 6.4–8.2)
RBC # BLD: 4.84 MIL/MCL (ref 4–5.2)
SODIUM SERPL-SCNC: 138 MMOL/L (ref 135–145)
TRIGL SERPL-MCNC: 116 MG/DL
WBC # BLD: 5.8 K/MCL (ref 4.2–11)

## 2025-03-07 ENCOUNTER — LAB REQUISITION (OUTPATIENT)
Dept: LAB | Age: 65
End: 2025-03-07

## 2025-03-07 DIAGNOSIS — L30.9 DERMATITIS, UNSPECIFIED: ICD-10-CM

## 2025-03-07 DIAGNOSIS — E79.0 HYPERURICEMIA WITHOUT SIGNS OF INFLAMMATORY ARTHRITIS AND TOPHACEOUS DISEASE: ICD-10-CM

## 2025-03-07 DIAGNOSIS — E63.0 ESSENTIAL FATTY ACID (EFA) DEFICIENCY: ICD-10-CM

## 2025-03-07 DIAGNOSIS — E55.9 VITAMIN D DEFICIENCY, UNSPECIFIED: ICD-10-CM

## 2025-03-07 DIAGNOSIS — E78.2 MIXED HYPERLIPIDEMIA: ICD-10-CM

## 2025-03-07 DIAGNOSIS — R79.0 ABNORMAL LEVEL OF BLOOD MINERAL: ICD-10-CM

## 2025-03-07 DIAGNOSIS — G47.00 INSOMNIA, UNSPECIFIED: ICD-10-CM

## 2025-03-07 DIAGNOSIS — A04.8 OTHER SPECIFIED BACTERIAL INTESTINAL INFECTIONS: ICD-10-CM

## 2025-03-07 DIAGNOSIS — R60.0 LOCALIZED EDEMA: ICD-10-CM

## 2025-03-07 DIAGNOSIS — R63.5 ABNORMAL WEIGHT GAIN: ICD-10-CM

## 2025-03-07 DIAGNOSIS — N95.1 MENOPAUSAL AND FEMALE CLIMACTERIC STATES: ICD-10-CM

## 2025-03-07 DIAGNOSIS — R79.82 ELEVATED C-REACTIVE PROTEIN (CRP): ICD-10-CM

## 2025-03-07 DIAGNOSIS — E03.9 HYPOTHYROIDISM, UNSPECIFIED: ICD-10-CM

## 2025-03-07 PROCEDURE — PSEU10042 COPPER, BLOOD: Performed by: CLINICAL MEDICAL LABORATORY

## 2025-03-07 PROCEDURE — 84590 ASSAY OF VITAMIN A: CPT | Performed by: CLINICAL MEDICAL LABORATORY

## 2025-03-07 PROCEDURE — PSEU8299 THYROID STIMULATING HORMONE: Performed by: CLINICAL MEDICAL LABORATORY

## 2025-03-07 PROCEDURE — 82746 ASSAY OF FOLIC ACID SERUM: CPT | Performed by: CLINICAL MEDICAL LABORATORY

## 2025-03-07 PROCEDURE — PSEU8263 GGT: Performed by: CLINICAL MEDICAL LABORATORY

## 2025-03-07 PROCEDURE — 84481 FREE ASSAY (FT-3): CPT | Performed by: CLINICAL MEDICAL LABORATORY

## 2025-03-07 PROCEDURE — 83036 HEMOGLOBIN GLYCOSYLATED A1C: CPT | Performed by: CLINICAL MEDICAL LABORATORY

## 2025-03-07 PROCEDURE — 84255 ASSAY OF SELENIUM: CPT | Performed by: CLINICAL MEDICAL LABORATORY

## 2025-03-07 PROCEDURE — 82977 ASSAY OF GGT: CPT | Performed by: CLINICAL MEDICAL LABORATORY

## 2025-03-07 PROCEDURE — 82533 TOTAL CORTISOL: CPT | Performed by: CLINICAL MEDICAL LABORATORY

## 2025-03-07 PROCEDURE — PSEU8196 FREE T3: Performed by: CLINICAL MEDICAL LABORATORY

## 2025-03-07 PROCEDURE — PSEU8303 URIC ACID: Performed by: CLINICAL MEDICAL LABORATORY

## 2025-03-07 PROCEDURE — PSEU8115 INSULIN LEVEL, FASTING: Performed by: CLINICAL MEDICAL LABORATORY

## 2025-03-07 PROCEDURE — PSEU8300 THYROXINE: Performed by: CLINICAL MEDICAL LABORATORY

## 2025-03-07 PROCEDURE — PSEU8250 COMPREHENSIVE METABOLIC PANEL: Performed by: CLINICAL MEDICAL LABORATORY

## 2025-03-07 PROCEDURE — 82306 VITAMIN D 25 HYDROXY: CPT | Performed by: CLINICAL MEDICAL LABORATORY

## 2025-03-07 PROCEDURE — PSEU8506 C REACTIVE PROTEIN HIGH SENSITIVITY: Performed by: CLINICAL MEDICAL LABORATORY

## 2025-03-07 PROCEDURE — 84630 ASSAY OF ZINC: CPT | Performed by: CLINICAL MEDICAL LABORATORY

## 2025-03-07 PROCEDURE — 82525 ASSAY OF COPPER: CPT | Performed by: CLINICAL MEDICAL LABORATORY

## 2025-03-07 PROCEDURE — PSEU8229 VITAMIN D -25 HYDROXY: Performed by: CLINICAL MEDICAL LABORATORY

## 2025-03-07 PROCEDURE — PSEU8191 DHEA SULFATE: Performed by: CLINICAL MEDICAL LABORATORY

## 2025-03-07 PROCEDURE — 82607 VITAMIN B-12: CPT | Performed by: CLINICAL MEDICAL LABORATORY

## 2025-03-07 PROCEDURE — PSEU8904 ZINC: Performed by: CLINICAL MEDICAL LABORATORY

## 2025-03-07 PROCEDURE — 84443 ASSAY THYROID STIM HORMONE: CPT | Performed by: CLINICAL MEDICAL LABORATORY

## 2025-03-07 PROCEDURE — 82728 ASSAY OF FERRITIN: CPT | Performed by: CLINICAL MEDICAL LABORATORY

## 2025-03-07 PROCEDURE — 84550 ASSAY OF BLOOD/URIC ACID: CPT | Performed by: CLINICAL MEDICAL LABORATORY

## 2025-03-07 PROCEDURE — PSEU8187 CORTISOL: Performed by: CLINICAL MEDICAL LABORATORY

## 2025-03-07 PROCEDURE — 82627 DEHYDROEPIANDROSTERONE: CPT | Performed by: CLINICAL MEDICAL LABORATORY

## 2025-03-07 PROCEDURE — 83525 ASSAY OF INSULIN: CPT | Performed by: CLINICAL MEDICAL LABORATORY

## 2025-03-07 PROCEDURE — 80053 COMPREHEN METABOLIC PANEL: CPT | Performed by: CLINICAL MEDICAL LABORATORY

## 2025-03-07 PROCEDURE — PSEU8538 IMMUNOGLOBULINS QUANTITATIVE: Performed by: CLINICAL MEDICAL LABORATORY

## 2025-03-07 PROCEDURE — PSEU8266 GLYCOHEMOGLOBIN: Performed by: CLINICAL MEDICAL LABORATORY

## 2025-03-07 PROCEDURE — 86141 C-REACTIVE PROTEIN HS: CPT | Performed by: CLINICAL MEDICAL LABORATORY

## 2025-03-07 PROCEDURE — 84436 ASSAY OF TOTAL THYROXINE: CPT | Performed by: CLINICAL MEDICAL LABORATORY

## 2025-03-07 PROCEDURE — 82784 ASSAY IGA/IGD/IGG/IGM EACH: CPT | Performed by: CLINICAL MEDICAL LABORATORY

## 2025-03-07 PROCEDURE — PSEU8259 FERRITIN: Performed by: CLINICAL MEDICAL LABORATORY

## 2025-03-07 PROCEDURE — PSEU8270 LIPID PANEL WITHOUT REFLEX: Performed by: CLINICAL MEDICAL LABORATORY

## 2025-03-07 PROCEDURE — 83090 ASSAY OF HOMOCYSTEINE: CPT | Performed by: CLINICAL MEDICAL LABORATORY

## 2025-03-07 PROCEDURE — 85025 COMPLETE CBC W/AUTO DIFF WBC: CPT | Performed by: CLINICAL MEDICAL LABORATORY

## 2025-03-07 PROCEDURE — 80061 LIPID PANEL: CPT | Performed by: CLINICAL MEDICAL LABORATORY

## 2025-03-07 PROCEDURE — PSEU8171 VITAMIN B12 AND FOLATE: Performed by: CLINICAL MEDICAL LABORATORY

## 2025-03-08 LAB
25(OH)D3+25(OH)D2 SERPL-MCNC: 67.2 NG/ML (ref 30–100)
ALBUMIN SERPL-MCNC: 3.9 G/DL (ref 3.4–5)
ALBUMIN/GLOB SERPL: 1.1 {RATIO} (ref 1–2.4)
ALP SERPL-CCNC: 107 UNITS/L (ref 45–117)
ALT SERPL-CCNC: 35 UNITS/L
ANION GAP SERPL CALC-SCNC: 12 MMOL/L (ref 7–19)
AST SERPL-CCNC: 21 UNITS/L
BASOPHILS # BLD: 0 K/MCL (ref 0–0.3)
BASOPHILS NFR BLD: 1 %
BILIRUB SERPL-MCNC: 0.6 MG/DL (ref 0.2–1)
BUN SERPL-MCNC: 18 MG/DL (ref 6–20)
BUN/CREAT SERPL: 21 (ref 7–25)
CALCIUM SERPL-MCNC: 9.6 MG/DL (ref 8.4–10.2)
CHLORIDE SERPL-SCNC: 105 MMOL/L (ref 97–110)
CHOLEST SERPL-MCNC: 247 MG/DL
CHOLEST/HDLC SERPL: 4.3 {RATIO}
CO2 SERPL-SCNC: 26 MMOL/L (ref 21–32)
CORTIS SERPL-MCNC: 12.8 MCG/DL (ref 3.4–22.5)
CREAT SERPL-MCNC: 0.84 MG/DL (ref 0.51–0.95)
CRP SERPL HS-MCNC: 3.25 MG/L
DEPRECATED RDW RBC: 44.1 FL (ref 39–50)
EGFRCR SERPLBLD CKD-EPI 2021: 79 ML/MIN/{1.73_M2}
EOSINOPHIL # BLD: 0.1 K/MCL (ref 0–0.5)
EOSINOPHIL NFR BLD: 1 %
ERYTHROCYTE [DISTWIDTH] IN BLOOD: 13.2 % (ref 11–15)
FASTING DURATION TIME PATIENT: ABNORMAL H
FASTING DURATION TIME PATIENT: NORMAL H
FERRITIN SERPL-MCNC: 200 NG/ML (ref 8–252)
FOLATE SERPL-MCNC: 12.4 NG/ML
GGT SERPL-CCNC: 29 UNITS/L (ref 5–55)
GLOBULIN SER-MCNC: 3.5 G/DL (ref 2–4)
GLUCOSE SERPL-MCNC: 105 MG/DL (ref 70–99)
HBA1C MFR BLD: 5.7 % (ref 4.5–5.6)
HCT VFR BLD CALC: 42.6 % (ref 36–46.5)
HCYS SERPL-SCNC: 9.4 MCMOL/L
HDLC SERPL-MCNC: 58 MG/DL
HGB BLD-MCNC: 14.2 G/DL (ref 12–15.5)
IGA SERPL-MCNC: 254 MG/DL (ref 70–400)
IGG SERPL-MCNC: 1270 MG/DL (ref 700–1600)
IGM SERPL-MCNC: 65 MG/DL (ref 40–230)
IMM GRANULOCYTES # BLD AUTO: 0 K/MCL (ref 0–0.2)
IMM GRANULOCYTES # BLD: 0 %
INSULIN P FAST SERPL-ACNC: 18 MUNITS/L (ref 3–28)
LDLC SERPL CALC-MCNC: 164 MG/DL
LYMPHOCYTES # BLD: 1.9 K/MCL (ref 1–4)
LYMPHOCYTES NFR BLD: 33 %
MCH RBC QN AUTO: 30.4 PG (ref 26–34)
MCHC RBC AUTO-ENTMCNC: 33.3 G/DL (ref 32–36.5)
MCV RBC AUTO: 91.2 FL (ref 78–100)
MONOCYTES # BLD: 0.4 K/MCL (ref 0.3–0.9)
MONOCYTES NFR BLD: 7 %
NEUTROPHILS # BLD: 3.3 K/MCL (ref 1.8–7.7)
NEUTROPHILS NFR BLD: 58 %
NONHDLC SERPL-MCNC: 189 MG/DL
NRBC BLD MANUAL-RTO: 0 /100 WBC
PLATELET # BLD AUTO: 305 K/MCL (ref 140–450)
POTASSIUM SERPL-SCNC: 4.2 MMOL/L (ref 3.4–5.1)
PROT SERPL-MCNC: 7.4 G/DL (ref 6.4–8.2)
RBC # BLD: 4.67 MIL/MCL (ref 4–5.2)
SODIUM SERPL-SCNC: 139 MMOL/L (ref 135–145)
T3FREE SERPL-MCNC: 2.6 PG/ML (ref 2.2–4)
T4 SERPL-MCNC: 8.8 MCG/DL (ref 4.7–13.3)
TRIGL SERPL-MCNC: 124 MG/DL
TSH SERPL-ACNC: 4.41 MCUNITS/ML (ref 0.35–5)
URATE SERPL-MCNC: 6.8 MG/DL (ref 2.6–5.9)
VIT B12 SERPL-MCNC: 400 PG/ML (ref 211–911)
WBC # BLD: 5.7 K/MCL (ref 4.2–11)

## 2025-03-10 LAB
COPPER SERPL-MCNC: 101 MCG/DL (ref 80–155)
DHEA-S SERPL-MCNC: 130.4 MCG/DL (ref 8–391)
SELENIUM SERPL-MCNC: 141.5 UG/L (ref 23–190)
ZINC SERPL-MCNC: 90 MCG/DL (ref 70–120)

## 2025-03-12 LAB
ANNOTATION COMMENT IMP: NORMAL
RETINYL PALMITATE SERPL-MCNC: <0.02 MG/L (ref 0–0.1)
VIT A SERPL-MCNC: 0.6 MG/L (ref 0.3–1.2)

## 2025-03-13 ENCOUNTER — MED REC SCAN ONLY (OUTPATIENT)
Dept: INTERNAL MEDICINE CLINIC | Facility: CLINIC | Age: 65
End: 2025-03-13

## 2025-03-13 ENCOUNTER — OFFICE VISIT (OUTPATIENT)
Dept: INTERNAL MEDICINE CLINIC | Facility: CLINIC | Age: 65
End: 2025-03-13
Payer: COMMERCIAL

## 2025-03-13 VITALS
SYSTOLIC BLOOD PRESSURE: 134 MMHG | HEART RATE: 60 BPM | DIASTOLIC BLOOD PRESSURE: 74 MMHG | HEIGHT: 63.5 IN | TEMPERATURE: 99 F | WEIGHT: 153 LBS | OXYGEN SATURATION: 96 % | BODY MASS INDEX: 26.77 KG/M2

## 2025-03-13 DIAGNOSIS — I10 HYPERTENSION, BENIGN: Primary | ICD-10-CM

## 2025-03-13 DIAGNOSIS — Z00.00 ROUTINE HEALTH MAINTENANCE: ICD-10-CM

## 2025-03-13 DIAGNOSIS — Z78.0 POSTMENOPAUSE: ICD-10-CM

## 2025-03-13 DIAGNOSIS — Z12.31 ENCOUNTER FOR SCREENING MAMMOGRAM FOR MALIGNANT NEOPLASM OF BREAST: ICD-10-CM

## 2025-03-13 DIAGNOSIS — E03.8 OTHER SPECIFIED HYPOTHYROIDISM: ICD-10-CM

## 2025-03-13 DIAGNOSIS — E55.9 VITAMIN D DEFICIENCY: ICD-10-CM

## 2025-03-13 DIAGNOSIS — E78.00 HYPERCHOLESTEROLEMIA: ICD-10-CM

## 2025-03-13 DIAGNOSIS — R73.03 PREDIABETES: ICD-10-CM

## 2025-03-13 PROCEDURE — 99396 PREV VISIT EST AGE 40-64: CPT | Performed by: INTERNAL MEDICINE

## 2025-03-13 PROCEDURE — 3075F SYST BP GE 130 - 139MM HG: CPT | Performed by: INTERNAL MEDICINE

## 2025-03-13 PROCEDURE — 3008F BODY MASS INDEX DOCD: CPT | Performed by: INTERNAL MEDICINE

## 2025-03-13 PROCEDURE — 3078F DIAST BP <80 MM HG: CPT | Performed by: INTERNAL MEDICINE

## 2025-03-13 RX ORDER — ACETYLCARNITINE HCL 100 %
POWDER (GRAM) MISCELLANEOUS 2 TIMES DAILY
COMMUNITY

## 2025-03-13 RX ORDER — VIT C/HESPERIDIN/BIOFLAVONOIDS 500-100 MG
1 TABLET ORAL DAILY
COMMUNITY

## 2025-03-13 NOTE — PROGRESS NOTES
Jessica Hicks is a 64 year old female.  Chief Complaint   Patient presents with    Physical       HPI:   Jessica Hicks is a 64 year old female who presents for a complete physical exam.     Feels well.    Had labs done thru a function doctor in 2024 then again 2025 after doing a plant-based detox diet with minimal sugar.  LDL much improved!  Has lost 11 lbs.        Wt Readings from Last 6 Encounters:   25 153 lb (69.4 kg)   23 157 lb 3.2 oz (71.3 kg)   22 158 lb (71.7 kg)   21 153 lb (69.4 kg)   21 152 lb 9.6 oz (69.2 kg)   20 150 lb (68 kg)     Body mass index is 26.68 kg/m².       Current Outpatient Medications   Medication Sig Dispense Refill    NON FORMULARY VITAMIN K2 WITH D3. 2 tablets daily      NON FORMULARY CAPRIN 2 tablets daily      NON FORMULARY Methyl B complex 1 tablet daily      NON FORMULARY TMG 1 tablet daily      NON FORMULARY LIQUID IODINE 2 drops daily      Acetylcarnitine HCl (ACETYL-L-CARNITINE HCL) Does not apply Powder Take by mouth 2 (two) times a day.      Barberry-Oreg Grape-Goldenseal (BERBERINE COMPLEX OR) Take 1 tablet by mouth in the morning and 1 tablet before bedtime.      Zinc 30 MG Oral Tab Take 1 tablet by mouth daily.      NON FORMULARY OMEGA AVAIL TG 2 capsules daily      NON FORMULARY SELENIUM SYNERGY 1 tablet daily      atenolol 50 MG Oral Tab TAKE 1 TABLET BY MOUTH EVERY DAY 90 tablet 0    levothyroxine 25 MCG Oral Tab TAKE 1 TABLET BY MOUTH BEFORE BREAKFAST. 90 tablet 0    Multiple Vitamin (MULTI-VITAMINS) Oral Tab Take 1 tablet by mouth daily.        Past Medical History:    Acute, but ill-defined, cerebrovascular disease    Father had stroke in     Allergic rhinitis    Chronic constipation    Diabetes (HCC)    Mother has type 2    Essential hypertension    Take atenolol 50 mg once a day    Hypertension    Hypothyroidism    Myocardial infarction (HCC)    Mom  of heart attach in 2019      Past Surgical History:    Procedure Laterality Date    Colonoscopy  2015      89      Family History   Problem Relation Age of Onset    Hypertension Sister     Stroke Father         CVA    Other (Other) Father     Diabetes Mother         Type 2    Breast Cancer Mother         81    Cancer Mother         Estrogen based breast cancer    Musculo-skelatal Disorder Mother         Parkinsonism    Hypertension Other         family h/o    Hypertension Brother       Social History:   Social History     Socioeconomic History    Marital status:    Tobacco Use    Smoking status: Never     Passive exposure: Past    Smokeless tobacco: Never   Vaping Use    Vaping status: Never Used   Substance and Sexual Activity    Alcohol use: Yes     Alcohol/week: 1.0 standard drink of alcohol     Types: 1 Cans of beer per week     Comment: Occasionally    Drug use: Never   Other Topics Concern    Caffeine Concern Yes     Comment: coffee 2 cups          REVIEW OF SYSTEMS:   GENERAL: feels well otherwise  SKIN: denies any unusual skin lesions  EYES:denies blurred vision or double vision  HEENT: denies nasal congestion, sinus pain or ST  LUNGS: denies shortness of breath with exertion or cough  CARDIOVASCULAR: denies chest pain, pressure, or palpitations  GI: denies abdominal pain, nausea, vomiting, diarrhea, constipation, hematochezia, or melena  NEURO: denies headaches or dizziness    EXAM:   /78   Pulse 60   Temp 98.7 °F (37.1 °C) (Oral)   Ht 5' 3.5\" (1.613 m)   Wt 153 lb (69.4 kg)   LMP  (LMP Unknown)   SpO2 96%   Breastfeeding No   BMI 26.68 kg/m²     GENERAL: well developed, well nourished, in no apparent distress  HEENT: normal oropharynx, normal TM's  EYES: PERRLA, EOMI, conjunctivae are pink  NECK: supple, no cervical or supraclavicular LAD, no carotid bruits  BREAST: no dominant or suspicious mass, no axillary LAD  LUNGS: clear to auscultation  CARDIO: RRR, normal S1S2, no gallops or murmurs  GI: soft, NT, ND, NABS,  no HSM  EXTREMITIES: no LE edema      ASSESSMENT AND PLAN:     Hypertension, benign  On atenolol 50mg/day.      Hypercholesterolemia  -LDL persistently elevated (160's-170's) since at least 2016.  Normal HDL.  TG's intermittently high  -recommended a statin in 2/2024 but pt wanted to hold off and cont to work on lifestyle changes  -pt started plant based diet in 12/2024 (thru functional medicine Dr. Aurelio Lopez)  ->134  ->116  -cont with healthy diet    Prediabetes  -HgbA1c 5.6>5.4     Squamous cell skin ca R hand  -s/p Moh's surgery 1/2024     Hypothyroidism  Cont synthroid.   Pt states she had TSH done in 2/2025 -- will send results     Routine health maintenance  Mammo ordered (last 12/19/23)  DEXA normal 12/2019.  Check repeat  Colonoscopy done 9/23/15 -- normal (Dr. Mooney) -- next 9/2025; pt asked to schedule    Pap/HPV normal 10/3/23  HCV Ab negative (screening).    Tdap given 11/28/17  Rec Shingrix shingles vaccine.      Vitamin D deficiency  Level was low -- starting Vitamin D/K thru functional medicine (was told to take 10,000 u/day) - explained that that is a very high dose.  Would recommend decreasing to 5000 u/day.  To have repeat level thru Dr. Lopez.            RTC 1 yr.

## 2025-04-23 ENCOUNTER — HOSPITAL ENCOUNTER (OUTPATIENT)
Age: 65
Discharge: HOME | End: 2025-04-23
Payer: COMMERCIAL

## 2025-04-23 VITALS
RESPIRATION RATE: 16 BRPM | HEART RATE: 57 BPM | SYSTOLIC BLOOD PRESSURE: 144 MMHG | DIASTOLIC BLOOD PRESSURE: 54 MMHG | OXYGEN SATURATION: 100 % | TEMPERATURE: 98 F

## 2025-04-23 DIAGNOSIS — B96.89 BACTERIAL CONJUNCTIVITIS OF BOTH EYES: Primary | ICD-10-CM

## 2025-04-23 DIAGNOSIS — H10.9 BACTERIAL CONJUNCTIVITIS OF BOTH EYES: Primary | ICD-10-CM

## 2025-04-23 DIAGNOSIS — J01.10 ACUTE NON-RECURRENT FRONTAL SINUSITIS: ICD-10-CM

## 2025-04-23 PROCEDURE — 99203 OFFICE O/P NEW LOW 30 MIN: CPT | Performed by: NURSE PRACTITIONER

## 2025-04-23 RX ORDER — AZITHROMYCIN 250 MG/1
TABLET, FILM COATED ORAL
Qty: 6 TABLET | Refills: 0 | Status: SHIPPED | OUTPATIENT
Start: 2025-04-23 | End: 2025-04-28

## 2025-04-23 RX ORDER — MOXIFLOXACIN 5 MG/ML
1 SOLUTION/ DROPS OPHTHALMIC 3 TIMES DAILY
Qty: 1 EACH | Refills: 0 | Status: SHIPPED | OUTPATIENT
Start: 2025-04-23 | End: 2025-04-28

## 2025-04-23 NOTE — ED PROVIDER NOTES
Patient Seen in: [facility]      History     Chief Complaint   Patient presents with    Eye Problem     Stated Complaint: Sinus Problem/ Eye Problem    Subjective:   HPI    64 year oldfemale pw frontal sinus pressure x 1 day. Denies f/c/n/v/d. No recent sick exposures. Denies recent infections/covid exposures. Also c/o bilateral eye itching since yesterday. No visual changes. EOMI, PERRLA    No recent antibiotic use.    History of Present Illness               Objective:     Past Medical History:    Acute, but ill-defined, cerebrovascular disease    Father had stroke in     Allergic rhinitis    Chronic constipation    Diabetes (HCC)    Mother has type 2    Essential hypertension    Take atenolol 50 mg once a day    Hypertension    Hypothyroidism    Myocardial infarction (HCC)    Mom  of heart attach in 2019              Past Surgical History:   Procedure Laterality Date    Colonoscopy  2015      89                Social History     Socioeconomic History    Marital status:    Tobacco Use    Smoking status: Never     Passive exposure: Past    Smokeless tobacco: Never   Vaping Use    Vaping status: Never Used   Substance and Sexual Activity    Alcohol use: Yes     Alcohol/week: 1.0 standard drink of alcohol     Types: 1 Cans of beer per week     Comment: Occasionally    Drug use: Never   Other Topics Concern    Caffeine Concern Yes     Comment: coffee 2 cups              Review of Systems    Positive for stated complaint: Sinus Problem/ Eye Problem  Other systems are as noted in HPI.  Constitutional and vital signs reviewed.      All other systems reviewed and negative except as noted above.                  Physical Exam     ED Triage Vitals [25 1035]   /54   Pulse 57   Resp 16   Temp 97.9 °F (36.6 °C)   Temp src Oral   SpO2 100 %   O2 Device None (Room air)       Current Vitals:   Vital Signs  BP: 144/54  Pulse: 57  Resp: 16  Temp: 97.9 °F (36.6  °C)  Temp src: Oral    Oxygen Therapy  SpO2: 100 %  O2 Device: None (Room air)        Physical Exam  Vitals and nursing note reviewed.   Constitutional:       General: She is not in acute distress.     Appearance: She is not toxic-appearing.   HENT:      Head: Normocephalic.      Right Ear: Tympanic membrane normal.      Left Ear: Tympanic membrane normal.      Nose: Nose normal. No congestion or rhinorrhea.      Mouth/Throat:      Mouth: Mucous membranes are moist.   Eyes:      General:         Right eye: Discharge present.         Left eye: Discharge present.     Extraocular Movements: Extraocular movements intact.      Pupils: Pupils are equal, round, and reactive to light.   Pulmonary:      Effort: Pulmonary effort is normal. No respiratory distress.   Abdominal:      General: Abdomen is flat.   Musculoskeletal:         General: Normal range of motion.      Cervical back: Normal range of motion.   Skin:     General: Skin is warm and dry.      Capillary Refill: Capillary refill takes less than 2 seconds.   Neurological:      General: No focal deficit present.      Mental Status: She is alert.           Physical Exam                ED Course   Labs Reviewed - No data to display       Results                                 MDM            Medical Decision Making  64 year oldfemale frontal facial pain over many sites. Not odontogenic in nature. Diff Dx include sinusitis, infectious vs. allergic rhinitis, vs. viral URI. Most likely sinusitis will treat as discussed w/patient. VSS. In good condition. Also has bacterial conjunctivitis will treat.     Viral vs bacterial etiology of sinusitis. Discharge on augmentin for sinusitis. flonase over the counter nasal spray as well.     Physical exam remained stable over serial reexaminations as previously documented. External chart review completed no recent acute IC visits or hospitalizations are noted.     I have discussed with the patient the results of tests, differential  diagnosis, and warning signs and symptoms that should prompt immediate return.  The patient understands these instructions and agrees to the follow-up plan provided.  There are no barriers to learning.   Appropriate f/u given.  Patient agrees to return for any concerns/problems/complications.    Differential diagnosis reflecting the complexity of care include: see above    Comorbidities that add complexity to management include:na    External chart review was done and was noted:Yes    History obtained by an independent source was from: Patient    Discussions of management was done with:Patient    My independent interpretation of studies of:na    Diagnostic tests and medications considered but not ordered were:na    Social determinants of health that affect care:NA    Shared decision making was done by Self, Patient             Disposition and Plan     Clinical Impression:  1. Bacterial conjunctivitis of both eyes    2. Acute non-recurrent frontal sinusitis         Disposition:  Discharge  4/23/2025 10:57 am    Follow-up:  Vicky Crystal MD  [address] [place] [phone]  [phone]                Medications Prescribed:  Discharge Medication List as of 4/23/2025 11:01 AM        START taking these medications    Details   moxifloxacin 0.5 % Ophthalmic Solution Place 1 drop into both eyes 3 (three) times daily for 5 days., Normal, Disp-1 each, R-0      azithromycin (ZITHROMAX Z-KATIE) 250 MG Oral Tab 500 mg once followed by 250 mg daily x 4 days, Normal, Disp-6 tablet, R-0             Supplementary Documentation:

## 2025-04-23 NOTE — DISCHARGE INSTRUCTIONS
Good hand washing before and especially after touching eye.     May also take flonase and zyrtec for ear, nose, throat symptoms.     If your vision changes acutely, you develop a severe headache, you may need to go to the ER immediately.     Otherwise take eye drops as prescribed. May return to work within 24 hours.      We are treating you for a sinus infection. There is a chance that this may be viral and antibiotics may be ineffective. Please complete all of the course of antibiotics.     Please add on Flonase or Nasacort if you have not   already done so.     What is sinusitis?   This is a condition that can cause a stuffy nose, pain in the face, and discharge or \"mucus\" from the nose.  The sinuses are hollow areas in the bones of the face (figure 1). They have a thin lining that normally makes a small amount of mucus. When this lining gets irritated or infected, it swells and makes extra mucus. This causes symptoms.  Sinusitis usually happens after a person gets sick with a cold. The germs causing the cold can infect the sinuses, too. Sometimes, other germs can be the cause of the infection. Often, a person feels like their cold is getting better. But then, they get sinusitis and begin to feel sick again.  What are the symptoms of sinusitis?   Common symptoms include:  ?Stuffy or blocked nose  ?Thick white, yellow, or green discharge from the nose  ?Pain in the teeth  ?Pain or pressure in the face - This often feels worse when bending forward.  People with sinusitis can also have other symptoms, such as:  ?Fever  ?Cough  ?Trouble smelling  ?Ear pressure or fullness  ?Headache  ?Bad breath  ?Feeling tired  Most of the time, symptoms start to improve in 7 to 10 days.  How is sinusitis treated?   Most of the time, sinusitis does not need to be treated with antibiotic medicines. This is because most cases of sinusitis are caused by viruses, not bacteria, and antibiotics do not kill viruses. In fact, even sinusitis  caused by bacteria usually gets better on its own without antibiotics.  Some people with sinusitis do need antibiotics. If your symptoms have not improved after 10 days, ask your doctor if you should take antibiotics. They might recommend that you wait 1 more week to see if your symptoms improve. But if you have symptoms such as a fever or a lot of pain, of if you have certain health conditions, they might prescribe antibiotics. If you do get them, follow all of your doctor's instructions about taking them.  What can I do on my own to feel better?   To help with your symptoms, you can:  ?Take an over-the-counter pain reliever to help with pain.  ?Rinse your nose and sinuses with salt water a few times a day - Ask your doctor or nurse about the best way to do this.  ?Drink plenty of fluids - Staying hydrated might help thin the mucus and make it drain more easily.  Your doctor might also recommend a steroid nose spray to reduce swelling in your nose, especially if you have allergies. Talk to your doctor if you are interested in this.    When should I call the doctor?   See your doctor or nurse if your symptoms last more than 10 days, or if your symptoms first get better but then get worse.  Rarely, sinusitis can lead to serious problems. See your doctor or nurse right away (do not wait 10 days) if you have:  ?Fever higher than 102°F (38.9°C)  ?Sudden and severe pain in the face and head  ?Trouble seeing, or seeing double  ?Trouble thinking clearly  ?Swelling or redness around 1 or both eyes  ?Stiff neck  ?Trouble moving your eye normally, or pain with eye movement

## 2025-04-29 RX ORDER — LEVOTHYROXINE SODIUM 25 UG/1
25 TABLET ORAL
Qty: 90 TABLET | Refills: 3 | Status: SHIPPED | OUTPATIENT
Start: 2025-04-29

## 2025-04-29 RX ORDER — ATENOLOL 50 MG/1
50 TABLET ORAL DAILY
Qty: 90 TABLET | Refills: 3 | Status: SHIPPED | OUTPATIENT
Start: 2025-04-29

## 2025-04-29 NOTE — TELEPHONE ENCOUNTER
Refill request is for a maintenance medication and has met the criteria specified in the Ambulatory Medication Refill Standing Order for eligibility, visits, laboratory, alerts and was sent to the requested pharmacy.    Requested Prescriptions     Signed Prescriptions Disp Refills    atenolol 50 MG Oral Tab 90 tablet 3     Sig: TAKE 1 TABLET BY MOUTH EVERY DAY     Authorizing Provider: KAMRAN GRANT     Ordering User: REX CASTRO    levothyroxine 25 MCG Oral Tab 90 tablet 3     Sig: TAKE 1 TABLET BY MOUTH BEFORE BREAKFAST.     Authorizing Provider: KAMRAN GRANT     Ordering User: REX CASTRO

## 2025-05-09 ENCOUNTER — LAB REQUISITION (OUTPATIENT)
Dept: LAB | Age: 65
End: 2025-05-09

## 2025-05-09 DIAGNOSIS — I10 ESSENTIAL (PRIMARY) HYPERTENSION: ICD-10-CM

## 2025-05-09 DIAGNOSIS — E03.9 HYPOTHYROIDISM, UNSPECIFIED: ICD-10-CM

## 2025-05-09 DIAGNOSIS — E79.0 HYPERURICEMIA WITHOUT SIGNS OF INFLAMMATORY ARTHRITIS AND TOPHACEOUS DISEASE: ICD-10-CM

## 2025-05-09 DIAGNOSIS — N95.1 MENOPAUSAL AND FEMALE CLIMACTERIC STATES: ICD-10-CM

## 2025-05-09 DIAGNOSIS — R63.5 ABNORMAL WEIGHT GAIN: ICD-10-CM

## 2025-05-09 DIAGNOSIS — R60.0 LOCALIZED EDEMA: ICD-10-CM

## 2025-05-09 DIAGNOSIS — R79.82 ELEVATED C-REACTIVE PROTEIN (CRP): ICD-10-CM

## 2025-05-09 DIAGNOSIS — L30.9 DERMATITIS, UNSPECIFIED: ICD-10-CM

## 2025-05-09 DIAGNOSIS — A04.8 OTHER SPECIFIED BACTERIAL INTESTINAL INFECTIONS: ICD-10-CM

## 2025-05-09 DIAGNOSIS — R79.0 ABNORMAL LEVEL OF BLOOD MINERAL: ICD-10-CM

## 2025-05-09 DIAGNOSIS — E63.0 ESSENTIAL FATTY ACID (EFA) DEFICIENCY: ICD-10-CM

## 2025-05-09 DIAGNOSIS — E78.2 MIXED HYPERLIPIDEMIA: ICD-10-CM

## 2025-05-09 DIAGNOSIS — G47.00 INSOMNIA, UNSPECIFIED: ICD-10-CM

## 2025-05-09 PROCEDURE — 82746 ASSAY OF FOLIC ACID SERUM: CPT | Performed by: CLINICAL MEDICAL LABORATORY

## 2025-05-09 PROCEDURE — PSEU8270 LIPID PANEL WITHOUT REFLEX: Performed by: CLINICAL MEDICAL LABORATORY

## 2025-05-09 PROCEDURE — 82533 TOTAL CORTISOL: CPT | Performed by: CLINICAL MEDICAL LABORATORY

## 2025-05-09 PROCEDURE — 84443 ASSAY THYROID STIM HORMONE: CPT | Performed by: CLINICAL MEDICAL LABORATORY

## 2025-05-09 PROCEDURE — PSEU8250 COMPREHENSIVE METABOLIC PANEL: Performed by: CLINICAL MEDICAL LABORATORY

## 2025-05-09 PROCEDURE — 84590 ASSAY OF VITAMIN A: CPT | Performed by: CLINICAL MEDICAL LABORATORY

## 2025-05-09 PROCEDURE — 83018 HEAVY METAL QUAN EACH NES: CPT | Performed by: CLINICAL MEDICAL LABORATORY

## 2025-05-09 PROCEDURE — 83036 HEMOGLOBIN GLYCOSYLATED A1C: CPT | Performed by: CLINICAL MEDICAL LABORATORY

## 2025-05-09 PROCEDURE — PSEU8299 THYROID STIMULATING HORMONE: Performed by: CLINICAL MEDICAL LABORATORY

## 2025-05-09 PROCEDURE — PSEU8115 INSULIN LEVEL, FASTING: Performed by: CLINICAL MEDICAL LABORATORY

## 2025-05-09 PROCEDURE — 85652 RBC SED RATE AUTOMATED: CPT | Performed by: CLINICAL MEDICAL LABORATORY

## 2025-05-09 PROCEDURE — 83525 ASSAY OF INSULIN: CPT | Performed by: CLINICAL MEDICAL LABORATORY

## 2025-05-09 PROCEDURE — 82977 ASSAY OF GGT: CPT | Performed by: CLINICAL MEDICAL LABORATORY

## 2025-05-09 PROCEDURE — 83090 ASSAY OF HOMOCYSTEINE: CPT | Performed by: CLINICAL MEDICAL LABORATORY

## 2025-05-09 PROCEDURE — 84436 ASSAY OF TOTAL THYROXINE: CPT | Performed by: CLINICAL MEDICAL LABORATORY

## 2025-05-09 PROCEDURE — 82306 VITAMIN D 25 HYDROXY: CPT | Performed by: CLINICAL MEDICAL LABORATORY

## 2025-05-09 PROCEDURE — 84481 FREE ASSAY (FT-3): CPT | Performed by: CLINICAL MEDICAL LABORATORY

## 2025-05-09 PROCEDURE — PSEU8303 URIC ACID: Performed by: CLINICAL MEDICAL LABORATORY

## 2025-05-09 PROCEDURE — 80061 LIPID PANEL: CPT | Performed by: CLINICAL MEDICAL LABORATORY

## 2025-05-09 PROCEDURE — PSEU8196 FREE T3: Performed by: CLINICAL MEDICAL LABORATORY

## 2025-05-09 PROCEDURE — 82607 VITAMIN B-12: CPT | Performed by: CLINICAL MEDICAL LABORATORY

## 2025-05-09 PROCEDURE — 83735 ASSAY OF MAGNESIUM: CPT | Performed by: CLINICAL MEDICAL LABORATORY

## 2025-05-09 PROCEDURE — 82542 COL CHROMOTOGRAPHY QUAL/QUAN: CPT | Performed by: CLINICAL MEDICAL LABORATORY

## 2025-05-09 PROCEDURE — 82728 ASSAY OF FERRITIN: CPT | Performed by: CLINICAL MEDICAL LABORATORY

## 2025-05-09 PROCEDURE — 86141 C-REACTIVE PROTEIN HS: CPT | Performed by: CLINICAL MEDICAL LABORATORY

## 2025-05-09 PROCEDURE — 80053 COMPREHEN METABOLIC PANEL: CPT | Performed by: CLINICAL MEDICAL LABORATORY

## 2025-05-09 PROCEDURE — 82784 ASSAY IGA/IGD/IGG/IGM EACH: CPT | Performed by: CLINICAL MEDICAL LABORATORY

## 2025-05-09 PROCEDURE — 84255 ASSAY OF SELENIUM: CPT | Performed by: CLINICAL MEDICAL LABORATORY

## 2025-05-09 PROCEDURE — 85025 COMPLETE CBC W/AUTO DIFF WBC: CPT | Performed by: CLINICAL MEDICAL LABORATORY

## 2025-05-09 PROCEDURE — PSEU8266 GLYCOHEMOGLOBIN: Performed by: CLINICAL MEDICAL LABORATORY

## 2025-05-09 PROCEDURE — PSEU8187 CORTISOL: Performed by: CLINICAL MEDICAL LABORATORY

## 2025-05-09 PROCEDURE — PSEU8506 C REACTIVE PROTEIN HIGH SENSITIVITY: Performed by: CLINICAL MEDICAL LABORATORY

## 2025-05-09 PROCEDURE — PSEU8171 VITAMIN B12 AND FOLATE: Performed by: CLINICAL MEDICAL LABORATORY

## 2025-05-09 PROCEDURE — PSEU8263 GGT: Performed by: CLINICAL MEDICAL LABORATORY

## 2025-05-09 PROCEDURE — PSEU8259 FERRITIN: Performed by: CLINICAL MEDICAL LABORATORY

## 2025-05-09 PROCEDURE — PSEU8300 THYROXINE: Performed by: CLINICAL MEDICAL LABORATORY

## 2025-05-09 PROCEDURE — PSEU8538 IMMUNOGLOBULINS QUANTITATIVE: Performed by: CLINICAL MEDICAL LABORATORY

## 2025-05-09 PROCEDURE — 84550 ASSAY OF BLOOD/URIC ACID: CPT | Performed by: CLINICAL MEDICAL LABORATORY

## 2025-05-09 PROCEDURE — PSEU8229 VITAMIN D -25 HYDROXY: Performed by: CLINICAL MEDICAL LABORATORY

## 2025-05-10 LAB
25(OH)D3+25(OH)D2 SERPL-MCNC: 65.3 NG/ML (ref 30–100)
ALBUMIN SERPL-MCNC: 4.2 G/DL (ref 3.4–5)
ALBUMIN/GLOB SERPL: 1.1 {RATIO} (ref 1–2.4)
ALP SERPL-CCNC: 116 UNITS/L (ref 45–117)
ALT SERPL-CCNC: 31 UNITS/L
ANION GAP SERPL CALC-SCNC: 10 MMOL/L (ref 7–19)
AST SERPL-CCNC: 20 UNITS/L
BASOPHILS # BLD: 0.1 K/MCL (ref 0–0.3)
BASOPHILS NFR BLD: 1 %
BILIRUB SERPL-MCNC: 0.5 MG/DL (ref 0.2–1)
BUN SERPL-MCNC: 18 MG/DL (ref 6–20)
BUN/CREAT SERPL: 22 (ref 7–25)
CALCIUM SERPL-MCNC: 9.9 MG/DL (ref 8.4–10.2)
CHLORIDE SERPL-SCNC: 106 MMOL/L (ref 97–110)
CHOLEST SERPL-MCNC: 244 MG/DL
CHOLEST/HDLC SERPL: 4 {RATIO}
CO2 SERPL-SCNC: 27 MMOL/L (ref 21–32)
CORTIS SERPL-MCNC: 14.5 MCG/DL (ref 3.4–22.5)
CREAT SERPL-MCNC: 0.81 MG/DL (ref 0.51–0.95)
CRP SERPL HS-MCNC: 4.07 MG/L
DEPRECATED RDW RBC: 44.8 FL (ref 39–50)
EGFRCR SERPLBLD CKD-EPI 2021: 82 ML/MIN/{1.73_M2}
EOSINOPHIL # BLD: 0.1 K/MCL (ref 0–0.5)
EOSINOPHIL NFR BLD: 2 %
ERYTHROCYTE [DISTWIDTH] IN BLOOD: 13.8 % (ref 11–15)
ERYTHROCYTE [SEDIMENTATION RATE] IN BLOOD BY WESTERGREN METHOD: 20 MM/HR (ref 0–20)
FASTING DURATION TIME PATIENT: NORMAL H
FASTING DURATION TIME PATIENT: NORMAL H
FERRITIN SERPL-MCNC: 236 NG/ML (ref 8–252)
FOLATE SERPL-MCNC: 21.3 NG/ML
GGT SERPL-CCNC: 25 UNITS/L (ref 5–55)
GLOBULIN SER-MCNC: 3.9 G/DL (ref 2–4)
GLUCOSE SERPL-MCNC: 98 MG/DL (ref 70–99)
HBA1C MFR BLD: 5.6 % (ref 4.5–5.6)
HCT VFR BLD CALC: 42.2 % (ref 36–46.5)
HCYS SERPL-SCNC: 7.8 MCMOL/L
HDLC SERPL-MCNC: 61 MG/DL
HGB BLD-MCNC: 14.2 G/DL (ref 12–15.5)
IGA SERPL-MCNC: 318 MG/DL (ref 70–400)
IGG SERPL-MCNC: 1460 MG/DL (ref 700–1600)
IGM SERPL-MCNC: 90 MG/DL (ref 40–230)
IMM GRANULOCYTES # BLD AUTO: 0 K/MCL (ref 0–0.2)
IMM GRANULOCYTES # BLD: 0 %
INSULIN P FAST SERPL-ACNC: 15 MUNITS/L (ref 3–28)
LDLC SERPL CALC-MCNC: 154 MG/DL
LYMPHOCYTES # BLD: 2.4 K/MCL (ref 1–4)
LYMPHOCYTES NFR BLD: 27 %
MCH RBC QN AUTO: 30.1 PG (ref 26–34)
MCHC RBC AUTO-ENTMCNC: 33.6 G/DL (ref 32–36.5)
MCV RBC AUTO: 89.4 FL (ref 78–100)
MONOCYTES # BLD: 0.4 K/MCL (ref 0.3–0.9)
MONOCYTES NFR BLD: 4 %
NEUTROPHILS # BLD: 5.7 K/MCL (ref 1.8–7.7)
NEUTROPHILS NFR BLD: 66 %
NONHDLC SERPL-MCNC: 183 MG/DL
NRBC BLD MANUAL-RTO: 0 /100 WBC
PLATELET # BLD AUTO: 383 K/MCL (ref 140–450)
POTASSIUM SERPL-SCNC: 4.9 MMOL/L (ref 3.4–5.1)
PROT SERPL-MCNC: 8.1 G/DL (ref 6.4–8.2)
RBC # BLD: 4.72 MIL/MCL (ref 4–5.2)
SODIUM SERPL-SCNC: 138 MMOL/L (ref 135–145)
T3FREE SERPL-MCNC: 2.8 PG/ML (ref 2.2–4)
T4 SERPL-MCNC: 10.2 MCG/DL (ref 4.7–13.3)
TRIGL SERPL-MCNC: 146 MG/DL
TSH SERPL-ACNC: 2.65 MCUNITS/ML (ref 0.35–5)
URATE SERPL-MCNC: 6.3 MG/DL (ref 2.6–5.9)
VIT B12 SERPL-MCNC: 738 PG/ML (ref 211–911)
WBC # BLD: 8.7 K/MCL (ref 4.2–11)

## 2025-05-12 LAB
COPPER SERPL-MCNC: 140 MCG/DL (ref 80–155)
DHEA-S SERPL-MCNC: 121.7 MCG/DL (ref 8–391)
SELENIUM SERPL-MCNC: 163.2 UG/L (ref 23–190)
ZINC SERPL-MCNC: 104 MCG/DL (ref 70–120)

## 2025-05-13 LAB — MAGNESIUM RBC-MCNC: 6.9 MG/DL (ref 3.6–7.5)

## 2025-05-14 LAB
ANNOTATION COMMENT IMP: NORMAL
IODINE SERPL-MCNC: 81.5 UG/L (ref 40–92)
RETINYL PALMITATE SERPL-MCNC: <0.02 MG/L (ref 0–0.1)
VIT A SERPL-MCNC: 0.68 MG/L (ref 0.3–1.2)

## 2025-05-29 LAB — SERVICE CMNT-IMP: NORMAL

## (undated) NOTE — ED AVS SNAPSHOT
Nikita Loza   MRN: [id number]    Department:  [facility]   Date of Visit:  2/4/2019           Disclosure     Insurance plans vary and the physician(s) referred by the ER may not be covered by your plan.  Please contact your CARE PHYSICIAN AT ONCE OR RETURN IMMEDIATELY TO THE EMERGENCY DEPARTMENT. If you have been prescribed any medication(s), please fill your prescription right away and begin taking the medication(s) as directed.   If you believe that any of the medications